# Patient Record
Sex: MALE | Race: WHITE | NOT HISPANIC OR LATINO | Employment: OTHER | URBAN - METROPOLITAN AREA
[De-identification: names, ages, dates, MRNs, and addresses within clinical notes are randomized per-mention and may not be internally consistent; named-entity substitution may affect disease eponyms.]

---

## 2017-01-24 ENCOUNTER — TRANSFERRED RECORDS (OUTPATIENT)
Dept: HEALTH INFORMATION MANAGEMENT | Facility: CLINIC | Age: 52
End: 2017-01-24

## 2017-01-30 DIAGNOSIS — I10 HTN, GOAL BELOW 140/90: ICD-10-CM

## 2017-01-30 DIAGNOSIS — E78.5 HYPERLIPIDEMIA LDL GOAL <130: Primary | ICD-10-CM

## 2017-01-30 NOTE — TELEPHONE ENCOUNTER
simvastatin (ZOCOR) 40 MG tablet       Last Written Prescription Date: 01/14/2016  Last Fill Quantity: 90, # refills: 3    Last Office Visit with [facility], [facility] or [facility] prescribing provider:  05/31/2016   Future Office Visit:      CHOLESTEROL   Date Value Ref Range Status   01/14/2016 207* <200 mg/dL Final     Comment:     Desirable:       <200 mg/dl     HDL CHOLESTEROL   Date Value Ref Range Status   01/14/2016 49 >39 mg/dL Final     LDL CHOLESTEROL CALCULATED   Date Value Ref Range Status   01/14/2016 128* <100 mg/dL Final     Comment:     Above desirable:  100-129 mg/dl   Borderline High:  130-159 mg/dL   High:             160-189 mg/dL   Very high:       >189 mg/dl       TRIGLYCERIDES   Date Value Ref Range Status   01/14/2016 148 <150 mg/dL Final     Comment:     Fasting specimen     CHOLESTEROL/HDL RATIO   Date Value Ref Range Status   12/15/2014 3.6 0.0 - 5.0 Final     ALT   Date Value Ref Range Status   11/27/2013 44 0 - 70 U/L Final     valsartan-hydrochlorothiazide (DIOVAN HCT) 80-12.5 MG per tablet     Last Written Prescription Date: 06/23/2016  Last Fill Quantity: 90, # refills: 1  Last Office Visit with [facility], [facility] or [facility] prescribing provider: 05/31/2016       POTASSIUM   Date Value Ref Range Status   12/01/2016 3.7 3.4 - 5.3 mmol/L Final     CREATININE   Date Value Ref Range Status   12/01/2016 1.07 0.66 - 1.25 mg/dL Final     BP Readings from Last 3 Encounters:   12/15/16 132/84   05/31/16 130/86   04/05/16 133/87

## 2017-02-01 RX ORDER — VALSARTAN AND HYDROCHLOROTHIAZIDE 80; 12.5 MG/1; MG/1
1 TABLET, FILM COATED ORAL DAILY
Qty: 90 TABLET | Refills: 0 | Status: SHIPPED | OUTPATIENT
Start: 2017-02-01 | End: 2017-05-03

## 2017-02-01 RX ORDER — SIMVASTATIN 40 MG
40 TABLET ORAL AT BEDTIME
Qty: 90 TABLET | Refills: 0 | Status: SHIPPED | OUTPATIENT
Start: 2017-02-01 | End: 2017-05-03

## 2017-02-14 ENCOUNTER — ALLIED HEALTH/NURSE VISIT (OUTPATIENT)
Dept: FAMILY MEDICINE | Facility: OTHER | Age: 52
End: 2017-02-14
Payer: COMMERCIAL

## 2017-02-14 DIAGNOSIS — T38.0X5A STEROID-INDUCED DIABETES MELLITUS (H): ICD-10-CM

## 2017-02-14 DIAGNOSIS — M79.671 RIGHT FOOT PAIN: Primary | ICD-10-CM

## 2017-02-14 DIAGNOSIS — E09.9 STEROID-INDUCED DIABETES MELLITUS (H): ICD-10-CM

## 2017-02-14 LAB
ANION GAP SERPL CALCULATED.3IONS-SCNC: 10 MMOL/L (ref 3–14)
BUN SERPL-MCNC: 8 MG/DL (ref 7–30)
CALCIUM SERPL-MCNC: 9.1 MG/DL (ref 8.5–10.1)
CHLORIDE SERPL-SCNC: 101 MMOL/L (ref 94–109)
CO2 SERPL-SCNC: 30 MMOL/L (ref 20–32)
CREAT SERPL-MCNC: 0.9 MG/DL (ref 0.66–1.25)
GFR SERPL CREATININE-BSD FRML MDRD: 88 ML/MIN/1.7M2
GLUCOSE SERPL-MCNC: 120 MG/DL (ref 70–99)
POTASSIUM SERPL-SCNC: 3 MMOL/L (ref 3.4–5.3)
SODIUM SERPL-SCNC: 141 MMOL/L (ref 133–144)

## 2017-02-14 PROCEDURE — 80048 BASIC METABOLIC PNL TOTAL CA: CPT | Performed by: NURSE PRACTITIONER

## 2017-02-14 PROCEDURE — 99207 ZZC NO CHARGE NURSE ONLY: CPT

## 2017-02-14 PROCEDURE — 36415 COLL VENOUS BLD VENIPUNCTURE: CPT | Performed by: NURSE PRACTITIONER

## 2017-02-14 NOTE — MR AVS SNAPSHOT
"              After Visit Summary   2/14/2017    Florentino Gallegos    MRN: [id number]           Patient Information     Date Of Birth          1965        Visit Information        Provider Department      2/14/2017 11:00 AM NL RN [facility]        Today's Diagnoses     Right foot pain    -  1       Follow-ups after your visit        Your next 10 appointments already scheduled     Feb 15, 2017  1:20 PM CST   Office Visit with EVAN Lopez CNP   [facility]    [address] [facility] [id number] [phone]           Bring a current list of meds and any records pertaining to this visit.  For Physicals, please bring immunization records and any forms needing to be filled out.  Please arrive 10 minutes early to complete paperwork.              Who to contact     If you have questions or need follow up information about today's clinic visit or your schedule please contact [facility] directly at [phone].  Normal or non-critical lab and imaging results will be communicated to you by Explore Engagehart, letter or phone within 4 business days after the clinic has received the results. If you do not hear from us within 7 days, please contact the clinic through Explore Engagehart or phone. If you have a critical or abnormal lab result, we will notify you by phone as soon as possible.  Submit refill requests through [employer] or call your pharmacy and they will forward the refill request to us. Please allow 3 business days for your refill to be completed.          Additional Information About Your Visit        Explore Engagehart Information     [employer] lets you send messages to your doctor, view your test results, renew your prescriptions, schedule appointments and more. To sign up, go to [email or web address] . Click on \"Log in\" on the left side of the screen, which will take you to the Welcome page. Then click on \"Sign up Now\" on the right side of the page. "     You will be asked to enter the access code listed below, as well as some personal information. Please follow the directions to create your username and password.     Your access code is: [email or web address]  Expires: 3/15/2017 10:54 AM     Your access code will  in 90 days. If you need help or a new code, please call your [facility] or [phone].        Care EveryWhere ID     This is your Care EveryWhere ID. This could be used by other organizations to access your [place] medical records  [id number]         Blood Pressure from Last 3 Encounters:   12/15/16 132/84   16 130/86   16 133/87    Weight from Last 3 Encounters:   12/15/16 278 lb (126.1 kg)   16 275 lb (124.7 kg)   16 279 lb (126.6 kg)              Today, you had the following     No orders found for display       Primary Care Provider Office Phone # Fax #    EVAN Lopez [phone]       [facility] [address] [facility] [address]        Thank you!     Thank you for choosing [facility]  for your care. Our goal is always to provide you with excellent care. Hearing back from our patients is one way we can continue to improve our services. Please take a few minutes to complete the written survey that you may receive in the mail after your visit with us. Thank you!             Your Updated Medication List - Protect others around you: Learn how to safely use, store and throw away your medicines at www.disposemymeds.org.          This list is accurate as of: 17 12:09 PM.  Always use your most recent med list.                   Brand Name Dispense Instructions for use    albuterol 108 (90 BASE) MCG/ACT Inhaler    PROAIR HFA/PROVENTIL HFA/VENTOLIN HFA    3 Inhaler    Inhale 2 puffs into the lungs every 4 hours as needed       metFORMIN 500 MG tablet    GLUCOPHAGE    60 tablet    Take 1 tablet (500 mg) by mouth 2 times daily (with meals)       methylPREDNISolone 16 MG  tablet    MEDROL     Take 8 mg by mouth daily (with lunch) Taper dosing       simvastatin 40 MG tablet    ZOCOR    90 tablet    Take 1 tablet (40 mg) by mouth At Bedtime at bedtime.       valsartan-hydrochlorothiazide 80-12.5 MG per tablet    DIOVAN HCT    90 tablet    Take 1 tablet by mouth daily

## 2017-02-15 ENCOUNTER — OFFICE VISIT (OUTPATIENT)
Dept: FAMILY MEDICINE | Facility: OTHER | Age: 52
End: 2017-02-15
Payer: COMMERCIAL

## 2017-02-15 VITALS
SYSTOLIC BLOOD PRESSURE: 114 MMHG | OXYGEN SATURATION: 97 % | BODY MASS INDEX: 40.77 KG/M2 | WEIGHT: 269 LBS | DIASTOLIC BLOOD PRESSURE: 70 MMHG | TEMPERATURE: 99.4 F | HEART RATE: 105 BPM | RESPIRATION RATE: 20 BRPM | HEIGHT: 68 IN

## 2017-02-15 DIAGNOSIS — M10.071 ACUTE IDIOPATHIC GOUT OF RIGHT FOOT: Primary | ICD-10-CM

## 2017-02-15 DIAGNOSIS — R60.1 GENERALIZED EDEMA: ICD-10-CM

## 2017-02-15 DIAGNOSIS — T38.0X5A STEROID-INDUCED DIABETES MELLITUS (H): ICD-10-CM

## 2017-02-15 DIAGNOSIS — I10 HTN, GOAL BELOW 140/90: ICD-10-CM

## 2017-02-15 DIAGNOSIS — E09.9 STEROID-INDUCED DIABETES MELLITUS (H): ICD-10-CM

## 2017-02-15 PROCEDURE — 99207 C FOOT EXAM  NO CHARGE: CPT | Performed by: NURSE PRACTITIONER

## 2017-02-15 PROCEDURE — 99214 OFFICE O/P EST MOD 30 MIN: CPT | Performed by: NURSE PRACTITIONER

## 2017-02-15 RX ORDER — INDOMETHACIN 50 MG/1
50 CAPSULE ORAL
Qty: 42 CAPSULE | Refills: 1 | Status: SHIPPED | OUTPATIENT
Start: 2017-02-15 | End: 2017-12-11

## 2017-02-15 RX ORDER — HYDROCHLOROTHIAZIDE 12.5 MG/1
12.5 CAPSULE ORAL DAILY
Qty: 30 CAPSULE | Refills: 1 | Status: SHIPPED | OUTPATIENT
Start: 2017-02-15 | End: 2017-05-12

## 2017-02-15 ASSESSMENT — PAIN SCALES - GENERAL: PAINLEVEL: EXTREME PAIN (8)

## 2017-02-15 NOTE — PROGRESS NOTES
SUBJECTIVE:                                                    Florentino Gallegos is a 51 year old male who presents to clinic today for the following health issues:      Foot Pain  - duration: 1.5 weeks  -other sx: swelling  -location: Right > Left  -Hx: diabetes, HTN    Has chronic LE edema, but this is worsening now.  Right ankle pain has been severe for about a week.  History of gout and this feels similar.  Had previously been on high dose steroids for chronic lung disease, but weaned off this about 2 months ago.  He is wondering if that was keeping his gout under control as he did not have as many flares while on that.     Problem list and histories reviewed & adjusted, as indicated.  Additional history: none    Patient Active Problem List   Diagnosis     HYPERLIPIDEMIA LDL GOAL <130     HTN, goal below 140/90     Acute gouty arthritis     Obesity     Low back pain     Abnormal chest CT     Berylliosis (H)     Steroid-induced diabetes mellitus (H)     Past Surgical History   Procedure Laterality Date     C nonspecific procedure       Rt ankle fx-pins     C nonspecific procedure       appendectomy     Colonoscopy N/A 1/7/2015     Procedure: COLONOSCOPY;  Surgeon: Anna Holbrook MD;  Location:  GI       Social History   Substance Use Topics     Smoking status: Never Smoker     Smokeless tobacco: Never Used     Alcohol use 0.0 oz/week     0 Standard drinks or equivalent per week      Comment: about 6 beers 2-3 times/week.     Family History   Problem Relation Age of Onset     DIABETES Father      DIABETES Mother          Current Outpatient Prescriptions   Medication Sig Dispense Refill     hydrochlorothiazide (MICROZIDE) 12.5 MG capsule Take 1 capsule (12.5 mg) by mouth daily 30 capsule 1     indomethacin (INDOCIN) 50 MG capsule Take 1 capsule (50 mg) by mouth 3 times daily (with meals) 42 capsule 1     order for DME Equipment being ordered: compression stockings 1 Units 0     simvastatin (ZOCOR) 40 MG  "tablet Take 1 tablet (40 mg) by mouth At Bedtime at bedtime. 90 tablet 0     valsartan-hydrochlorothiazide (DIOVAN HCT) 80-12.5 MG per tablet Take 1 tablet by mouth daily 90 tablet 0     albuterol (PROAIR HFA, PROVENTIL HFA, VENTOLIN HFA) 108 (90 BASE) MCG/ACT inhaler Inhale 2 puffs into the lungs every 4 hours as needed 3 Inhaler 3     metFORMIN (GLUCOPHAGE) 500 MG tablet Take 1 tablet (500 mg) by mouth 2 times daily (with meals) 60 tablet 1     Allergies   Allergen Reactions     No Known Drug Allergies      BP Readings from Last 3 Encounters:   02/15/17 114/70   12/15/16 132/84   05/31/16 130/86    Wt Readings from Last 3 Encounters:   02/15/17 269 lb (122 kg)   12/15/16 278 lb (126.1 kg)   05/31/16 275 lb (124.7 kg)                    ROS:  C: NEGATIVE for fever, chills, change in weight  E/M: NEGATIVE for ear, mouth and throat problems  RESP:chronic lung disease, unchanged from baseline  CV: NEGATIVE for chest pain, palpitations   MUSCULOSKELETAL: bilateral LE swelling as above, right ankle pain as above     OBJECTIVE:                                                    /70 (BP Location: Left arm, Cuff Size: Adult Large)  Pulse 105  Temp 99.4  F (37.4  C) (Tympanic)  Resp 20  Ht 5' 8\" (1.727 m)  Wt 269 lb (122 kg)  SpO2 97%  BMI 40.9 kg/m2  Body mass index is 40.9 kg/(m^2).  GENERAL: healthy, alert and no distress  RESP: no rales , no rhonchi, no wheezes and decreased breath sounds throughout  CV: regular rate and rhythm, normal S1 S2, no S3 or S4, no murmur, click or rub  MS: Bilateral LE swelling +2, right side slightly worse compared to left.  No erythema or warmth.  He has significant pain on the right ankle.      Diabetic foot exam: normal DP and PT pulses, no trophic changes or ulcerative lesions and reduced sensation - plantar surface bilaterally     Diagnostic Test Results:  none      ASSESSMENT/PLAN:                                                        1. Acute idiopathic gout of right foot  Will " treat with Indomethacin for likely gout.  He would like to avoid steroids if at all possible.   - indomethacin (INDOCIN) 50 MG capsule; Take 1 capsule (50 mg) by mouth 3 times daily (with meals)  Dispense: 42 capsule; Refill: 1      2. Generalized edema  Will try increasing his diuretic slightly.  Will also try compression stockings.   - hydrochlorothiazide (MICROZIDE) 12.5 MG capsule; Take 1 capsule (12.5 mg) by mouth daily  Dispense: 30 capsule; Refill: 1  - order for DME; Equipment being ordered: compression stockings  Dispense: 1 Units; Refill: 0    3. HTN, goal below 140/90  Chronic, controlled.  No change in treatment plan.   - Basic metabolic panel  (Ca, Cl, CO2, Creat, Gluc, K, Na, BUN); Future    4. Steroid-induced diabetes mellitus (H)  Is no longer on oral steroids.  His last fasting glucose level was 120.  Will check an A1C next week, but this has likely resolved.  He already stopped the Metformin as it was making him nauseated.   - FOOT EXAM  - Albumin Random Urine Quantitative; Future  - TSH with free T4 reflex; Future  - Hemoglobin A1c; Future      He will return for repeat labs in 1 week.  Had mild hypokalemia on last BMP.  He reports he was not eating well at all due to his Metformin causing nausea and vomiting.  Will recheck in 1 week.   See Patient Instructions    EVAN Lopez [facility]

## 2017-02-15 NOTE — MR AVS SNAPSHOT
After Visit Summary   2/15/2017    Florentino Gallegos    MRN: [id number]           Patient Information     Date Of Birth          1965        Visit Information        Provider Department      2/15/2017 1:20 PM Shahrzad Fajardo APRN [facility]        Today's Diagnoses     Generalized edema    -  1    HTN, goal below 140/90        Acute idiopathic gout of right foot        Steroid-induced diabetes mellitus (H)          Care Instructions    Increase your diuretic.  I sent over a new prescription for this.  Take with your normal blood pressure medication.     Have labs done on Monday next week.     Try the compression stockings.  Wear all day, remove at night.     Take the Indomethacin as prescribed for the gout.  If this does not resolve, let me know.       Leg Swelling in Both Legs    Swelling of the feet, ankles, and legs is called edema. It is caused by excess fluid that has collected in the tissues. Extra fluid in the body settles in the lowest part because of gravity. This is why the legs and feet are most affected.  Some of the causes for edema include:    Disease of the heart like congestive heart failure    Standing or sitting for long periods of time. Sitting with your legs in the down position may do this.    Infection of the feet or legs    Blood pooling in the veins of your legs (venous insufficiency)    Dilated veins in your lower leg (varicose veins)    Garters or other clothing that is tight on your legs. This will cause blood to pool in your legs because the clothing limits blood flow.    Some medicines. These include hormones like birth control pills. They also include some blood pressure medicines like calcium channel blockers and steroids. Other medicines include some antidepressants like MAO inhibitors and tricyclics.    Menstrual periods that cause you to retain fluids    Many types of renal disease    Liver failure or cirrhosis    Pregnancy. Some swelling is  normal, but a sudden increase in leg swelling or weight gain can be a sign of a dangerous complication of pregnancy..    Poor nutrition  Medical treatment will depend on what is causing the swelling in your legs. Your health care provider may prescribe water pills (diuretics) to get rid of the extra fluid.  Home care  Follow these guidelines when caring for yourself at home:    Don't wear clothing like garters that is tight on your legs.    Keep your legs up while lying or sitting.    If infection, injury, or recent surgery is causing the swelling, stay off your legs as much as possible until symptoms get better.    If your health care provider says that your leg swelling is caused by venous insufficiency or varicose veins, don't sit or  one place for long periods of time. Take breaks and walk about every few hours. Brisk walking is a good exercise. It helps circulate the blood that has collected in your leg. Talk with your provider about using support stockings to stop daytime leg swelling.    If your provider says that heart disease is causing your leg swelling, follow a low-salt diet to stop extra fluid from staying in your body.  Follow-up care  Follow up with your health care provider, or as advised.  When to seek medical advice  Call your health care provider right away if any of these occur:    New shortness of breath or chest pain    Shortness of breath or chest pain that gets worse    Swelling in both legs or ankles that gets worse    Swelling of the abdomen    Redness, warmth, or swelling in one leg    Fever of 100.4 F (38 C) or higher, or as directed by your health care provider    Yellow color to your skin or eyes    Rapid, unexplained weight gain       8468-2417 [employer]. [address] [place] [address], [place] [address]. All rights reserved. This information is not intended as a substitute for professional medical care. Always follow your healthcare professional's  "instructions.                Follow-ups after your visit        Future tests that were ordered for you today     Open Future Orders        Priority Expected Expires Ordered    Albumin Random Urine Quantitative Routine  2/15/2018 2/15/2017    TSH with free T4 reflex Routine  2/15/2018 2/15/2017    Basic metabolic panel  (Ca, Cl, CO2, Creat, Gluc, K, Na, BUN) Routine  2/15/2018 2/15/2017    Hemoglobin A1c Routine  2/15/2018 2/15/2017            Who to contact     If you have questions or need follow up information about today's clinic visit or your schedule please contact [facility] directly at [phone].  Normal or non-critical lab and imaging results will be communicated to you by Payment pluginhart, letter or phone within 4 business days after the clinic has received the results. If you do not hear from us within 7 days, please contact the clinic through Payment pluginhart or phone. If you have a critical or abnormal lab result, we will notify you by phone as soon as possible.  Submit refill requests through [employer] or call your pharmacy and they will forward the refill request to us. Please allow 3 business days for your refill to be completed.          Additional Information About Your Visit        [employer] Information     We[employer]Engine lets you send messages to your doctor, view your test results, renew your prescriptions, schedule appointments and more. To sign up, go to [email or web address] . Click on \"Log in\" on the left side of the screen, which will take you to the Welcome page. Then click on \"Sign up Now\" on the right side of the page.     You will be asked to enter the access code listed below, as well as some personal information. Please follow the directions to create your username and password.     Your access code is: [email or web address]  Expires: 3/15/2017 10:54 AM     Your access code will  in 90 days. If you need help or a new code, please call your [facility] or [phone].        Care EveryWhere ID  " "   This is your Care EveryWhere ID. This could be used by other organizations to access your [place] medical records  [id number]        Your Vitals Were     Pulse Temperature Respirations Height Pulse Oximetry BMI (Body Mass Index)    105 99.4  F (37.4  C) (Tympanic) 20 5' 8\" (1.727 m) 97% 40.9 kg/m2       Blood Pressure from Last 3 Encounters:   02/15/17 114/70   12/15/16 132/84   05/31/16 130/86    Weight from Last 3 Encounters:   02/15/17 269 lb (122 kg)   12/15/16 278 lb (126.1 kg)   05/31/16 275 lb (124.7 kg)              We Performed the Following     FOOT EXAM          Today's Medication Changes          These changes are accurate as of: 2/15/17  1:58 PM.  If you have any questions, ask your nurse or doctor.               Start taking these medicines.        Dose/Directions    hydrochlorothiazide 12.5 MG capsule   Commonly known as:  MICROZIDE   Used for:  Generalized edema   Started by:  Shahrzad Fajardo APRN CNP        Dose:  12.5 mg   Take 1 capsule (12.5 mg) by mouth daily   Quantity:  30 capsule   Refills:  1       indomethacin 50 MG capsule   Commonly known as:  INDOCIN   Used for:  Acute idiopathic gout of right foot   Started by:  Shahrzad Fajardo APRN CNP        Dose:  50 mg   Take 1 capsule (50 mg) by mouth 3 times daily (with meals)   Quantity:  42 capsule   Refills:  1       order for DME   Used for:  Acute idiopathic gout of right foot   Started by:  Shahrzad Fajardo APRN CNP        Equipment being ordered: compression stockings   Quantity:  1 Units   Refills:  0            Where to get your medicines      These medications were sent to [facility] - [address] [facility]  [address], [place] [address]     Phone:  [phone]     hydrochlorothiazide 12.5 MG capsule    indomethacin 50 MG capsule         Some of these will need a paper prescription and others can be bought over the counter.  Ask your nurse if you have questions.     Bring a paper prescription for each of these " medications     order for DME                Primary Care Provider Office Phone # Fax #    EVAN Lopez [phone]       [facility] [address] [facility] [address]        Thank you!     Thank you for choosing [facility]  for your care. Our goal is always to provide you with excellent care. Hearing back from our patients is one way we can continue to improve our services. Please take a few minutes to complete the written survey that you may receive in the mail after your visit with us. Thank you!             Your Updated Medication List - Protect others around you: Learn how to safely use, store and throw away your medicines at www.disposemymeds.org.          This list is accurate as of: 2/15/17  1:58 PM.  Always use your most recent med list.                   Brand Name Dispense Instructions for use    albuterol 108 (90 BASE) MCG/ACT Inhaler    PROAIR HFA/PROVENTIL HFA/VENTOLIN HFA    3 Inhaler    Inhale 2 puffs into the lungs every 4 hours as needed       hydrochlorothiazide 12.5 MG capsule    MICROZIDE    30 capsule    Take 1 capsule (12.5 mg) by mouth daily       indomethacin 50 MG capsule    INDOCIN    42 capsule    Take 1 capsule (50 mg) by mouth 3 times daily (with meals)       metFORMIN 500 MG tablet    GLUCOPHAGE    60 tablet    Take 1 tablet (500 mg) by mouth 2 times daily (with meals)       order for DME     1 Units    Equipment being ordered: compression stockings       simvastatin 40 MG tablet    ZOCOR    90 tablet    Take 1 tablet (40 mg) by mouth At Bedtime at bedtime.       valsartan-hydrochlorothiazide 80-12.5 MG per tablet    DIOVAN HCT    90 tablet    Take 1 tablet by mouth daily

## 2017-02-15 NOTE — NURSING NOTE
"Chief Complaint   Patient presents with     Musculoskeletal Problem     R>L       Initial /70 (BP Location: Left arm, Cuff Size: Adult Large)  Pulse 105  Temp 99.4  F (37.4  C) (Tympanic)  Resp 20  Ht 5' 8\" (1.727 m)  Wt 269 lb (122 kg)  SpO2 97%  BMI 40.9 kg/m2 Estimated body mass index is 40.9 kg/(m^2) as calculated from the following:    Height as of this encounter: 5' 8\" (1.727 m).    Weight as of this encounter: 269 lb (122 kg).  Medication Reconciliation: complete   ................Rebel Alan LPN,   February 15, 2017,      1:29 PM,   [facility]    "

## 2017-02-15 NOTE — PATIENT INSTRUCTIONS
Increase your diuretic.  I sent over a new prescription for this.  Take with your normal blood pressure medication.     Have labs done on Monday next week.     Try the compression stockings.  Wear all day, remove at night.     Take the Indomethacin as prescribed for the gout.  If this does not resolve, let me know.       Leg Swelling in Both Legs    Swelling of the feet, ankles, and legs is called edema. It is caused by excess fluid that has collected in the tissues. Extra fluid in the body settles in the lowest part because of gravity. This is why the legs and feet are most affected.  Some of the causes for edema include:    Disease of the heart like congestive heart failure    Standing or sitting for long periods of time. Sitting with your legs in the down position may do this.    Infection of the feet or legs    Blood pooling in the veins of your legs (venous insufficiency)    Dilated veins in your lower leg (varicose veins)    Garters or other clothing that is tight on your legs. This will cause blood to pool in your legs because the clothing limits blood flow.    Some medicines. These include hormones like birth control pills. They also include some blood pressure medicines like calcium channel blockers and steroids. Other medicines include some antidepressants like MAO inhibitors and tricyclics.    Menstrual periods that cause you to retain fluids    Many types of renal disease    Liver failure or cirrhosis    Pregnancy. Some swelling is normal, but a sudden increase in leg swelling or weight gain can be a sign of a dangerous complication of pregnancy..    Poor nutrition  Medical treatment will depend on what is causing the swelling in your legs. Your health care provider may prescribe water pills (diuretics) to get rid of the extra fluid.  Home care  Follow these guidelines when caring for yourself at home:    Don't wear clothing like garters that is tight on your legs.    Keep your legs up while lying or  sitting.    If infection, injury, or recent surgery is causing the swelling, stay off your legs as much as possible until symptoms get better.    If your health care provider says that your leg swelling is caused by venous insufficiency or varicose veins, don't sit or  one place for long periods of time. Take breaks and walk about every few hours. Brisk walking is a good exercise. It helps circulate the blood that has collected in your leg. Talk with your provider about using support stockings to stop daytime leg swelling.    If your provider says that heart disease is causing your leg swelling, follow a low-salt diet to stop extra fluid from staying in your body.  Follow-up care  Follow up with your health care provider, or as advised.  When to seek medical advice  Call your health care provider right away if any of these occur:    New shortness of breath or chest pain    Shortness of breath or chest pain that gets worse    Swelling in both legs or ankles that gets worse    Swelling of the abdomen    Redness, warmth, or swelling in one leg    Fever of 100.4 F (38 C) or higher, or as directed by your health care provider    Yellow color to your skin or eyes    Rapid, unexplained weight gain       4751-4262 [employer]. [address] [place] [address], [place] [address]. All rights reserved. This information is not intended as a substitute for professional medical care. Always follow your healthcare professional's instructions.

## 2017-02-23 DIAGNOSIS — T38.0X5A STEROID-INDUCED DIABETES MELLITUS (H): ICD-10-CM

## 2017-02-23 DIAGNOSIS — E09.9 STEROID-INDUCED DIABETES MELLITUS (H): ICD-10-CM

## 2017-02-23 DIAGNOSIS — I10 HTN, GOAL BELOW 140/90: ICD-10-CM

## 2017-02-23 LAB
ANION GAP SERPL CALCULATED.3IONS-SCNC: 10 MMOL/L (ref 3–14)
BUN SERPL-MCNC: 16 MG/DL (ref 7–30)
CALCIUM SERPL-MCNC: 8.6 MG/DL (ref 8.5–10.1)
CHLORIDE SERPL-SCNC: 110 MMOL/L (ref 94–109)
CO2 SERPL-SCNC: 28 MMOL/L (ref 20–32)
CREAT SERPL-MCNC: 1.1 MG/DL (ref 0.66–1.25)
CREAT UR-MCNC: 391 MG/DL
GFR SERPL CREATININE-BSD FRML MDRD: 70 ML/MIN/1.7M2
GLUCOSE SERPL-MCNC: 112 MG/DL (ref 70–99)
HBA1C MFR BLD: 5.5 % (ref 4.3–6)
MICROALBUMIN UR-MCNC: 21 MG/L
MICROALBUMIN/CREAT UR: 5.27 MG/G CR (ref 0–17)
POTASSIUM SERPL-SCNC: 3.2 MMOL/L (ref 3.4–5.3)
SODIUM SERPL-SCNC: 148 MMOL/L (ref 133–144)
TSH SERPL DL<=0.005 MIU/L-ACNC: 2.71 MU/L (ref 0.4–4)

## 2017-02-23 PROCEDURE — 82043 UR ALBUMIN QUANTITATIVE: CPT | Performed by: NURSE PRACTITIONER

## 2017-02-23 PROCEDURE — 83036 HEMOGLOBIN GLYCOSYLATED A1C: CPT | Performed by: NURSE PRACTITIONER

## 2017-02-23 PROCEDURE — 84443 ASSAY THYROID STIM HORMONE: CPT | Performed by: NURSE PRACTITIONER

## 2017-02-23 PROCEDURE — 80048 BASIC METABOLIC PNL TOTAL CA: CPT | Performed by: NURSE PRACTITIONER

## 2017-02-23 PROCEDURE — 36415 COLL VENOUS BLD VENIPUNCTURE: CPT | Performed by: NURSE PRACTITIONER

## 2017-03-10 ENCOUNTER — TELEPHONE (OUTPATIENT)
Dept: FAMILY MEDICINE | Facility: CLINIC | Age: 52
End: 2017-03-10

## 2017-03-10 NOTE — TELEPHONE ENCOUNTER
Patient called back would like forms forwarded to [facility] Attn:  Dr. Tam  Fax:  [phone]

## 2017-03-10 NOTE — TELEPHONE ENCOUNTER
Recd 6 page fax from [facility] for Physical Ability Assessment.  Pt has not been seen since May 2016 and needs appt to complete.  Spoke with patient and he will call back to reschedule or have form forwarded to [facility].  Form in TC basket at [place].    Harriett Gaming

## 2017-04-01 ENCOUNTER — EXTERNAL ORDER RESULTS (OUTPATIENT)
Dept: HEALTH INFORMATION MANAGEMENT | Facility: CLINIC | Age: 52
End: 2017-04-01

## 2017-04-11 ENCOUNTER — TRANSFERRED RECORDS (OUTPATIENT)
Dept: HEALTH INFORMATION MANAGEMENT | Facility: CLINIC | Age: 52
End: 2017-04-11

## 2017-04-12 ENCOUNTER — TRANSFERRED RECORDS (OUTPATIENT)
Dept: HEALTH INFORMATION MANAGEMENT | Facility: CLINIC | Age: 52
End: 2017-04-12

## 2017-04-12 LAB
ALT SERPL-CCNC: 36 U/L (ref 10–40)
AST SERPL-CCNC: 26 U/L (ref 10–40)
CREAT SERPL-MCNC: 1.2 MG/DL (ref 0.5–1.3)
GLUCOSE SERPL-MCNC: 94 MG/DL (ref 65–110)
HEP C HIM: NORMAL
POTASSIUM SERPL-SCNC: 3.7 MMOL/L (ref 3.7–5)

## 2017-04-13 ENCOUNTER — TRANSFERRED RECORDS (OUTPATIENT)
Dept: HEALTH INFORMATION MANAGEMENT | Facility: CLINIC | Age: 52
End: 2017-04-13

## 2017-04-17 ENCOUNTER — TRANSFERRED RECORDS (OUTPATIENT)
Dept: HEALTH INFORMATION MANAGEMENT | Facility: CLINIC | Age: 52
End: 2017-04-17

## 2017-05-03 DIAGNOSIS — I10 HTN, GOAL BELOW 140/90: ICD-10-CM

## 2017-05-03 DIAGNOSIS — E78.5 HYPERLIPIDEMIA LDL GOAL <130: ICD-10-CM

## 2017-05-03 RX ORDER — VALSARTAN AND HYDROCHLOROTHIAZIDE 80; 12.5 MG/1; MG/1
TABLET, FILM COATED ORAL
Qty: 90 TABLET | Refills: 0 | Status: SHIPPED | OUTPATIENT
Start: 2017-05-03 | End: 2017-05-12

## 2017-05-03 RX ORDER — SIMVASTATIN 40 MG
TABLET ORAL
Qty: 90 TABLET | Refills: 0 | Status: SHIPPED | OUTPATIENT
Start: 2017-05-03 | End: 2017-08-23

## 2017-05-03 NOTE — TELEPHONE ENCOUNTER
Simvastatin  Routing refill request to provider for review/approval because:  Labs out of range:  FLP  Labs not current:  FLP    Diovan  Routing refill request to provider for review/approval because:  Labs out of range:  Potassium    Valerie Knowles RN  [facility]

## 2017-05-03 NOTE — TELEPHONE ENCOUNTER
Simvastatin     Last Written Prescription Date: 2/1/17  Last Fill Quantity: 90, # refills: 0  Last Office Visit with [facility], [facility] or  Health prescribing provider: 2/15/17       Lab Results   Component Value Date    CHOL 207 01/14/2016     Lab Results   Component Value Date    HDL 49 01/14/2016     Lab Results   Component Value Date     01/14/2016     Lab Results   Component Value Date    TRIG 148 01/14/2016     Lab Results   Component Value Date    CHOLHDLRATIO 3.6 12/15/2014     Diovan      Last Written Prescription Date: 2/1/17  Last Fill Quantity: 90, # refills: 0  Last Office Visit with [facility], [facility] or [facility] prescribing provider: 2/15/17       Potassium   Date Value Ref Range Status   02/23/2017 3.2 (L) 3.4 - 5.3 mmol/L Final     Creatinine   Date Value Ref Range Status   02/23/2017 1.10 0.66 - 1.25 mg/dL Final     BP Readings from Last 3 Encounters:   02/15/17 114/70   12/15/16 132/84   05/31/16 130/86

## 2017-05-12 ENCOUNTER — OFFICE VISIT (OUTPATIENT)
Dept: FAMILY MEDICINE | Facility: CLINIC | Age: 52
End: 2017-05-12
Payer: COMMERCIAL

## 2017-05-12 ENCOUNTER — TRANSFERRED RECORDS (OUTPATIENT)
Dept: HEALTH INFORMATION MANAGEMENT | Facility: CLINIC | Age: 52
End: 2017-05-12

## 2017-05-12 VITALS
BODY MASS INDEX: 39.84 KG/M2 | TEMPERATURE: 98.7 F | HEIGHT: 69 IN | RESPIRATION RATE: 16 BRPM | WEIGHT: 269 LBS | SYSTOLIC BLOOD PRESSURE: 152 MMHG | HEART RATE: 66 BPM | DIASTOLIC BLOOD PRESSURE: 86 MMHG | OXYGEN SATURATION: 97 %

## 2017-05-12 DIAGNOSIS — E66.01 MORBID OBESITY DUE TO EXCESS CALORIES (H): ICD-10-CM

## 2017-05-12 DIAGNOSIS — J63.2: ICD-10-CM

## 2017-05-12 DIAGNOSIS — Z23 NEED FOR PNEUMOCOCCAL VACCINATION: ICD-10-CM

## 2017-05-12 DIAGNOSIS — I10 HTN, GOAL BELOW 140/90: Primary | ICD-10-CM

## 2017-05-12 PROCEDURE — 80061 LIPID PANEL: CPT | Performed by: FAMILY MEDICINE

## 2017-05-12 PROCEDURE — 90471 IMMUNIZATION ADMIN: CPT | Performed by: FAMILY MEDICINE

## 2017-05-12 PROCEDURE — 99214 OFFICE O/P EST MOD 30 MIN: CPT | Mod: 25 | Performed by: FAMILY MEDICINE

## 2017-05-12 PROCEDURE — 36415 COLL VENOUS BLD VENIPUNCTURE: CPT | Performed by: FAMILY MEDICINE

## 2017-05-12 PROCEDURE — 90670 PCV13 VACCINE IM: CPT | Performed by: FAMILY MEDICINE

## 2017-05-12 PROCEDURE — 80048 BASIC METABOLIC PNL TOTAL CA: CPT | Performed by: FAMILY MEDICINE

## 2017-05-12 RX ORDER — VALSARTAN AND HYDROCHLOROTHIAZIDE 160; 25 MG/1; MG/1
1 TABLET ORAL DAILY
Qty: 90 TABLET | Refills: 1 | Status: SHIPPED | OUTPATIENT
Start: 2017-05-12 | End: 2017-11-25

## 2017-05-12 NOTE — NURSING NOTE
"Chief Complaint   Patient presents with     Recheck Medication     fasting labs     Referral     echocardiogram and dexa scan       Initial /86 (BP Location: Right arm, Patient Position: Chair, Cuff Size: Adult Large)  Pulse 66  Temp 98.7  F (37.1  C) (Oral)  Resp 16  Ht 5' 9\" (1.753 m)  Wt 269 lb (122 kg)  SpO2 97%  BMI 39.72 kg/m2 Estimated body mass index is 39.72 kg/(m^2) as calculated from the following:    Height as of this encounter: 5' 9\" (1.753 m).    Weight as of this encounter: 269 lb (122 kg).  Medication Reconciliation: complete   Hermelinda Sharp, [place]      "

## 2017-05-12 NOTE — Clinical Note
Please abstract the following data from this visit with this patient into the appropriate field in Epic:  Eye exam with ophthalmology on this date: 4/2017

## 2017-05-12 NOTE — LETTER
[facility]  [address] [place], [address]  [phone]      May 13, 2017    Florentino Gallegos  [address] [facility] [address]          Dear Florentino,    Your cholesterol is much better than previous, and your blood sugar is normal. Enclosed is a copy of the results.  It was a pleasure to see you at your last appointment.    If you have any questions or concerns, please call myself or my nurse at [phone].          Sincerely,    Dimitris Bansal MD                                                  Results for orders placed or performed in visit on 05/12/17   Lipid panel reflex to direct LDL   Result Value Ref Range    Cholesterol 186 <200 mg/dL    Triglycerides 127 <150 mg/dL    HDL Cholesterol 43 >39 mg/dL    LDL Cholesterol Calculated 118 (H) <100 mg/dL    Non HDL Cholesterol 143 (H) <130 mg/dL   Basic metabolic panel   Result Value Ref Range    Sodium 144 133 - 144 mmol/L    Potassium 4.2 3.4 - 5.3 mmol/L    Chloride 112 (H) 94 - 109 mmol/L    Carbon Dioxide 26 20 - 32 mmol/L    Anion Gap 6 3 - 14 mmol/L    Glucose 100 (H) 70 - 99 mg/dL    Urea Nitrogen 14 7 - 30 mg/dL    Creatinine 1.07 0.66 - 1.25 mg/dL    GFR Estimate 72 >60 mL/min/1.7m2    GFR Estimate If Black 87 >60 mL/min/1.7m2    Calcium 9.0 8.5 - 10.1 mg/dL     KS

## 2017-05-12 NOTE — MR AVS SNAPSHOT
"              After Visit Summary   5/12/2017    Florentino Gallegos    MRN: [id number]           Patient Information     Date Of Birth          1965        Visit Information        Provider Department      5/12/2017 10:30 AM Dimitris Bansal MD [facility]        Today's Diagnoses     Need for hepatitis C screening test    -  1    HTN, goal below 140/90        Berylliosis (H)          Care Instructions    They will be calling you from [facility] for your Echo (heart test) appointment.  Your appointment for Dexa scan for the bone is going to be on   Follow up with me in 2 weeks, your appointment is on           Follow-ups after your visit        Future tests that were ordered for you today     Open Future Orders        Priority Expected Expires Ordered    Echocardiogram Complete Routine  5/12/2018 5/12/2017    DX Hip/Pelvis/Spine Routine  5/12/2018 5/12/2017            Who to contact     If you have questions or need follow up information about today's clinic visit or your schedule please contact [facility] directly at [phone].  Normal or non-critical lab and imaging results will be communicated to you by [employer], letter or phone within 4 business days after the clinic has received the results. If you do not hear from us within 7 days, please contact the clinic through [employer] or phone. If you have a critical or abnormal lab result, we will notify you by phone as soon as possible.  Submit refill requests through Dr. TATTOFF or call your pharmacy and they will forward the refill request to us. Please allow 3 business days for your refill to be completed.          Additional Information About Your Visit        [employer] Information     Dr. TATTOFF lets you send messages to your doctor, view your test results, renew your prescriptions, schedule appointments and more. To sign up, go to [email or web address] . Click on \"Log in\" on the left side of the screen, which will take you to " "the Welcome page. Then click on \"Sign up Now\" on the right side of the page.     You will be asked to enter the access code listed below, as well as some personal information. Please follow the directions to create your username and password.     Your access code is: [email or web address]  Expires: 8/10/2017 10:45 AM     Your access code will  in 90 days. If you need help or a new code, please call your [facility] clinic or [phone].        Care EveryWhere ID     This is your Care EveryWhere ID. This could be used by other organizations to access your [facility] medical records  [id number]        Your Vitals Were     Pulse Temperature Respirations Height Pulse Oximetry BMI (Body Mass Index)    66 98.7  F (37.1  C) (Oral) 16 5' 9\" (1.753 m) 97% 39.72 kg/m2       Blood Pressure from Last 3 Encounters:   17 152/86   02/15/17 114/70   12/15/16 132/84    Weight from Last 3 Encounters:   17 269 lb (122 kg)   02/15/17 269 lb (122 kg)   12/15/16 278 lb (126.1 kg)              We Performed the Following     Basic metabolic panel     Lipid panel reflex to direct LDL          Today's Medication Changes          These changes are accurate as of: 17 10:45 AM.  If you have any questions, ask your nurse or doctor.               Start taking these medicines.        Dose/Directions    valsartan-hydrochlorothiazide 160-25 MG per tablet   Commonly known as:  DIOVAN-HCT   Used for:  HTN, goal below 140/90   Replaces:  valsartan-hydrochlorothiazide 80-12.5 MG per tablet   Started by:  Dimitris Bansal MD        Dose:  1 tablet   Take 1 tablet by mouth daily   Quantity:  90 tablet   Refills:  1         Stop taking these medicines if you haven't already. Please contact your care team if you have questions.     valsartan-hydrochlorothiazide 80-12.5 MG per tablet   Commonly known as:  DIOVAN-HCT   Replaced by:  valsartan-hydrochlorothiazide 160-25 MG per tablet   Stopped by:  Dimitris Bansal MD                Where to get your " medicines      These medications were sent to [facility] - [place] - [address]  [address], [place] [address]     Phone:  [phone]     valsartan-hydrochlorothiazide 160-25 MG per tablet                Primary Care Provider Office Phone # Fax #    EVAN Lopez [phone]       [facility] [address]  [facility] [address]        Thank you!     Thank you for choosing [facility]  for your care. Our goal is always to provide you with excellent care. Hearing back from our patients is one way we can continue to improve our services. Please take a few minutes to complete the written survey that you may receive in the mail after your visit with us. Thank you!             Your Updated Medication List - Protect others around you: Learn how to safely use, store and throw away your medicines at www.disposemymeds.org.          This list is accurate as of: 5/12/17 10:45 AM.  Always use your most recent med list.                   Brand Name Dispense Instructions for use    albuterol 108 (90 BASE) MCG/ACT Inhaler    PROAIR HFA/PROVENTIL HFA/VENTOLIN HFA    3 Inhaler    Inhale 2 puffs into the lungs every 4 hours as needed       indomethacin 50 MG capsule    INDOCIN    42 capsule    Take 1 capsule (50 mg) by mouth 3 times daily (with meals)       order for DME     1 Units    Equipment being ordered: compression stockings       simvastatin 40 MG tablet    ZOCOR    90 tablet    TAKE ONE TABLET BY MOUTH AT BEDTIME       valsartan-hydrochlorothiazide 160-25 MG per tablet    DIOVAN-HCT    90 tablet    Take 1 tablet by mouth daily

## 2017-05-12 NOTE — PATIENT INSTRUCTIONS
They will be calling you from [facility] for your Echo (heart test) appointment.  Your appointment for Dexa scan for the bone is going to be on   Follow up with me in 2 weeks, your appointment is on

## 2017-05-12 NOTE — PROGRESS NOTES
SUBJECTIVE:                                                    Florentino Gallegos is a 52 year old male who presents to clinic today for the following health issues:      Hyperlipidemia Follow-Up      Rate your low fat/cholesterol diet?: fair    Taking statin?  Yes, no muscle aches from statin    Other lipid medications/supplements?:  none     Hypertension Follow-up      Outpatient blood pressures are not being checked.    Low Salt Diet: low salt       Amount of exercise or physical activity: 6-7 days/week for an average of greater than 60 minutes    Problems taking medications regularly: No    Medication side effects: none    Diet: regular (no restrictions)      Berilliousis  Follow-Up    Symptoms are currently: stable    Current fatigue or dyspnea with ambulation: yes, within 1 block.    Shortness of breath: yes 1 block or so.    Increased or change in Cough/Sputum: No    Fever(s): No    Baseline ambulation without stopping to rest 1 blocks. Able to walk up 1 flights of stairs without stopping to rest.    Any ER/UC or hospital admissions since your last visit? No     History   Smoking Status     Never Smoker   Smokeless Tobacco     Never Used     Lab Results   Component Value Date    FEV1 67 04/05/2016    HZB6FAA 98 04/05/2016          Problem list and histories reviewed & adjusted, as indicated.  Additional history: pt has been following up with pulmonary clinic in [place] in regards to his berilliosis, and they are still taking biopsies and lavage.  They recommended multiple tests as below.    Patient Active Problem List   Diagnosis     HYPERLIPIDEMIA LDL GOAL <130     HTN, goal below 140/90     Acute gouty arthritis     Low back pain     Abnormal chest CT     Berylliosis (H)     Steroid-induced diabetes mellitus (H)     Morbid obesity due to excess calories (H)     Past Surgical History:   Procedure Laterality Date     C NONSPECIFIC PROCEDURE      Rt ankle fx-pins     C NONSPECIFIC PROCEDURE      appendectomy  "    COLONOSCOPY N/A 1/7/2015    Procedure: COLONOSCOPY;  Surgeon: Anna Holbrook MD;  Location:  GI       Social History   Substance Use Topics     Smoking status: Never Smoker     Smokeless tobacco: Never Used     Alcohol use No     Family History   Problem Relation Age of Onset     DIABETES Father      DIABETES Mother          Current Outpatient Prescriptions   Medication Sig Dispense Refill     valsartan-hydrochlorothiazide (DIOVAN-HCT) 160-25 MG per tablet Take 1 tablet by mouth daily 90 tablet 1     simvastatin (ZOCOR) 40 MG tablet TAKE ONE TABLET BY MOUTH AT BEDTIME 90 tablet 0     indomethacin (INDOCIN) 50 MG capsule Take 1 capsule (50 mg) by mouth 3 times daily (with meals) 42 capsule 1     order for DME Equipment being ordered: compression stockings 1 Units 0     albuterol (PROAIR HFA, PROVENTIL HFA, VENTOLIN HFA) 108 (90 BASE) MCG/ACT inhaler Inhale 2 puffs into the lungs every 4 hours as needed 3 Inhaler 3     [DISCONTINUED] valsartan-hydrochlorothiazide (DIOVAN-HCT) 80-12.5 MG per tablet TAKE ONE TABLET BY MOUTH ONCE DAILY 90 tablet 0     Allergies   Allergen Reactions     No Known Drug Allergies        Reviewed and updated as needed this visit by clinical staff  Tobacco  Allergies  Fam Hx  Soc Hx      Reviewed and updated as needed this visit by Provider         ROS:  C: NEGATIVE for fever, chills, change in weight  E/M: NEGATIVE for ear, mouth and throat problems  CV: NEGATIVE for chest pain, palpitations or peripheral edema    OBJECTIVE:                                                    /86 (BP Location: Right arm, Patient Position: Chair, Cuff Size: Adult Large)  Pulse 66  Temp 98.7  F (37.1  C) (Oral)  Resp 16  Ht 5' 9\" (1.753 m)  Wt 269 lb (122 kg)  SpO2 97%  BMI 39.72 kg/m2  Body mass index is 39.72 kg/(m^2).  GENERAL: healthy, alert and no distress  NECK: no adenopathy, no asymmetry, masses, or scars and thyroid normal to palpation  RESP: lungs clear to auscultation - no " rales, rhonchi or wheezes  CV: regular rate and rhythm, normal S1 S2, no S3 or S4, no murmur, click or rub, no peripheral edema and peripheral pulses strong  ABDOMEN: soft, nontender, no hepatosplenomegaly, no masses and bowel sounds normal  MS: no gross musculoskeletal defects noted, no edema         ASSESSMENT/PLAN:                                                            1. HTN, goal below 140/90  Not controlled, incresae medicine to   - Lipid panel reflex to direct LDL  - valsartan-hydrochlorothiazide (DIOVAN-HCT) 160-25 MG per tablet; Take 1 tablet by mouth daily  Dispense: 90 tablet; Refill: 1  - Basic metabolic panel    2. Berylliosis (H)  Will schedule for   - DX Hip/Pelvis/Spine; Future  - Echocardiogram Complete; Future  According to [place] pulmonary clinic.    3. Need for pneumococcal vaccination  Requested by pulmonary.  - PNEUMOCOCCAL CONJ VACCINE 13 VALENT IM    4. Morbid obesity due to excess calories (H)  Talked about diet and exercise.      rosina kurtz in 2 weeks for BP check.    Dimitris Bansal MD  [facility]

## 2017-05-13 LAB
ANION GAP SERPL CALCULATED.3IONS-SCNC: 6 MMOL/L (ref 3–14)
BUN SERPL-MCNC: 14 MG/DL (ref 7–30)
CALCIUM SERPL-MCNC: 9 MG/DL (ref 8.5–10.1)
CHLORIDE SERPL-SCNC: 112 MMOL/L (ref 94–109)
CHOLEST SERPL-MCNC: 186 MG/DL
CO2 SERPL-SCNC: 26 MMOL/L (ref 20–32)
CREAT SERPL-MCNC: 1.07 MG/DL (ref 0.66–1.25)
GFR SERPL CREATININE-BSD FRML MDRD: 72 ML/MIN/1.7M2
GLUCOSE SERPL-MCNC: 100 MG/DL (ref 70–99)
HDLC SERPL-MCNC: 43 MG/DL
LDLC SERPL CALC-MCNC: 118 MG/DL
NONHDLC SERPL-MCNC: 143 MG/DL
POTASSIUM SERPL-SCNC: 4.2 MMOL/L (ref 3.4–5.3)
SODIUM SERPL-SCNC: 144 MMOL/L (ref 133–144)
TRIGL SERPL-MCNC: 127 MG/DL

## 2017-05-15 ENCOUNTER — RADIANT APPOINTMENT (OUTPATIENT)
Dept: BONE DENSITY | Facility: CLINIC | Age: 52
End: 2017-05-15
Payer: COMMERCIAL

## 2017-05-15 DIAGNOSIS — J63.2: ICD-10-CM

## 2017-05-15 PROCEDURE — 77080 DXA BONE DENSITY AXIAL: CPT | Performed by: FAMILY MEDICINE

## 2017-05-26 ENCOUNTER — OFFICE VISIT (OUTPATIENT)
Dept: FAMILY MEDICINE | Facility: CLINIC | Age: 52
End: 2017-05-26
Payer: COMMERCIAL

## 2017-05-26 ENCOUNTER — HOSPITAL ENCOUNTER (OUTPATIENT)
Dept: CARDIOLOGY | Facility: CLINIC | Age: 52
Discharge: HOME | End: 2017-05-26
Attending: FAMILY MEDICINE | Admitting: FAMILY MEDICINE
Payer: OTHER MISCELLANEOUS

## 2017-05-26 VITALS
HEART RATE: 63 BPM | TEMPERATURE: 98.6 F | RESPIRATION RATE: 16 BRPM | OXYGEN SATURATION: 98 % | HEIGHT: 69 IN | DIASTOLIC BLOOD PRESSURE: 81 MMHG | BODY MASS INDEX: 38.8 KG/M2 | SYSTOLIC BLOOD PRESSURE: 124 MMHG | WEIGHT: 262 LBS

## 2017-05-26 DIAGNOSIS — J63.2: ICD-10-CM

## 2017-05-26 DIAGNOSIS — I10 HTN, GOAL BELOW 140/90: ICD-10-CM

## 2017-05-26 PROCEDURE — 99213 OFFICE O/P EST LOW 20 MIN: CPT | Performed by: FAMILY MEDICINE

## 2017-05-26 PROCEDURE — 93306 TTE W/DOPPLER COMPLETE: CPT | Mod: 26 | Performed by: INTERNAL MEDICINE

## 2017-05-26 PROCEDURE — 25500064 ZZH RX 255 OP 636: Performed by: FAMILY MEDICINE

## 2017-05-26 PROCEDURE — 40000264 ECHO COMPLETE WITH OPTISON

## 2017-05-26 RX ADMIN — HUMAN ALBUMIN MICROSPHERES AND PERFLUTREN 3 ML: 10; .22 INJECTION, SOLUTION INTRAVENOUS at 14:40

## 2017-05-26 NOTE — PROGRESS NOTES
SUBJECTIVE:                                                    Florentino Gallegos is a 52 year old male who presents to clinic today for the following health issues:      Hypertension Follow-up      Outpatient blood pressures are not being checked.    Low Salt Diet: no added salt       Amount of exercise or physical activity: 6-7 days/week for an average of 45-60 minutes    Problems taking medications regularly: No    Medication side effects: none    Diet: regular (no restrictions)          Problem list and histories reviewed & adjusted, as indicated.  Additional history: discuss his results for dexascan.    Patient Active Problem List   Diagnosis     HYPERLIPIDEMIA LDL GOAL <130     HTN, goal below 140/90     Acute gouty arthritis     Low back pain     Abnormal chest CT     Berylliosis (H)     Steroid-induced diabetes mellitus (H)     Morbid obesity due to excess calories (H)     Past Surgical History:   Procedure Laterality Date     C NONSPECIFIC PROCEDURE      Rt ankle fx-pins     C NONSPECIFIC PROCEDURE      appendectomy     COLONOSCOPY N/A 1/7/2015    Procedure: COLONOSCOPY;  Surgeon: Anna Holbrook MD;  Location:  GI       Social History   Substance Use Topics     Smoking status: Never Smoker     Smokeless tobacco: Never Used     Alcohol use No     Family History   Problem Relation Age of Onset     DIABETES Father      DIABETES Mother          Current Outpatient Prescriptions   Medication Sig Dispense Refill     valsartan-hydrochlorothiazide (DIOVAN-HCT) 160-25 MG per tablet Take 1 tablet by mouth daily 90 tablet 1     simvastatin (ZOCOR) 40 MG tablet TAKE ONE TABLET BY MOUTH AT BEDTIME 90 tablet 0     indomethacin (INDOCIN) 50 MG capsule Take 1 capsule (50 mg) by mouth 3 times daily (with meals) 42 capsule 1     order for DME Equipment being ordered: compression stockings 1 Units 0     albuterol (PROAIR HFA, PROVENTIL HFA, VENTOLIN HFA) 108 (90 BASE) MCG/ACT inhaler Inhale 2 puffs into the lungs  "every 4 hours as needed 3 Inhaler 3     Allergies   Allergen Reactions     No Known Drug Allergies        Reviewed and updated as needed this visit by clinical staff  Tobacco  Allergies  Fam Hx  Soc Hx      Reviewed and updated as needed this visit by Provider         ROS:  C: NEGATIVE for fever, chills, change in weight  R: NEGATIVE for significant cough or SOB  CV: NEGATIVE for chest pain, palpitations or peripheral edema    OBJECTIVE:                                                    /81 (BP Location: Right arm, Patient Position: Chair, Cuff Size: Adult Large)  Pulse 63  Temp 98.6  F (37  C) (Oral)  Resp 16  Ht 5' 9\" (1.753 m)  Wt 262 lb (118.8 kg)  SpO2 98%  BMI 38.69 kg/m2  Body mass index is 38.69 kg/(m^2).  GENERAL: healthy, alert and no distress  NECK: no adenopathy, no asymmetry, masses, or scars and thyroid normal to palpation  RESP: lungs clear to auscultation - no rales, rhonchi or wheezes  CV: regular rate and rhythm, normal S1 S2, no S3 or S4, no murmur, click or rub, no peripheral edema and peripheral pulses strong  ABDOMEN: soft, nontender, no hepatosplenomegaly, no masses and bowel sounds normal  MS: no gross musculoskeletal defects noted, no edema         ASSESSMENT/PLAN:                                                            1. HTN, goal below 140/90  Well controlled, continue on same medication.s    Discuss all his lab results and Xray reports (dexa scan)  Pt to follow up with [place] clinic for his berylliosis.          Dimitris Bansal MD  [facility]\    "

## 2017-05-26 NOTE — NURSING NOTE
"Chief Complaint   Patient presents with     Hypertension       Initial /81 (BP Location: Right arm, Patient Position: Chair, Cuff Size: Adult Large)  Pulse 63  Temp 98.6  F (37  C) (Oral)  Resp 16  Ht 5' 9\" (1.753 m)  Wt 262 lb (118.8 kg)  SpO2 98%  BMI 38.69 kg/m2 Estimated body mass index is 38.69 kg/(m^2) as calculated from the following:    Height as of this encounter: 5' 9\" (1.753 m).    Weight as of this encounter: 262 lb (118.8 kg).  Medication Reconciliation: complete   Hermelinda Sharp, [place]      "

## 2017-05-26 NOTE — MR AVS SNAPSHOT
After Visit Summary   5/26/2017    Florentino Gallegos    MRN: [id number]           Patient Information     Date Of Birth          1965        Visit Information        Provider Department      5/26/2017 9:30 AM Dimitris Bansal MD [facility]        Today's Diagnoses     HTN, goal below 140/90           Follow-ups after your visit        Your next 10 appointments already scheduled     May 26, 2017  2:00 PM CDT   Ech Complete with [id number]   [facility])    [address]  [facility] [id number] [phone]           1.  Please bring or wear a comfortable two-piece outfit. 2.  You may eat, drink and take your normal medicines. 3.  For any questions that cannot be answered, please contact the ordering physician ***Please check-in at the [place] Registration Office located in [address] in the [facility] building. When you are finished registering, please go to [address] and have a seat. The technician will call your name for the test.              Who to contact     If you have questions or need follow up information about today's clinic visit or your schedule please contact [facility] directly at [phone].  Normal or non-critical lab and imaging results will be communicated to you by MyChart, letter or phone within 4 business days after the clinic has received the results. If you do not hear from us within 7 days, please contact the clinic through [employer] or phone. If you have a critical or abnormal lab result, we will notify you by phone as soon as possible.  Submit refill requests through [employer] or call your pharmacy and they will forward the refill request to us. Please allow 3 business days for your refill to be completed.          Additional Information About Your Visit        [employer] Information     [employer] lets you send messages to your doctor, view your test results,  "renew your prescriptions, schedule appointments and more. To sign up, go to [email or web address] . Click on \"Log in\" on the left side of the screen, which will take you to the Welcome page. Then click on \"Sign up Now\" on the right side of the page.     You will be asked to enter the access code listed below, as well as some personal information. Please follow the directions to create your username and password.     Your access code is: [email or web address]  Expires: 8/10/2017 10:45 AM     Your access code will  in 90 days. If you need help or a new code, please call your [facility] or [phone].        Care EveryWhere ID     This is your Care EveryWhere ID. This could be used by other organizations to access your [facility] medical records  [id number]        Your Vitals Were     Pulse Temperature Respirations Height Pulse Oximetry BMI (Body Mass Index)    63 98.6  F (37  C) (Oral) 16 5' 9\" (1.753 m) 98% 38.69 kg/m2       Blood Pressure from Last 3 Encounters:   17 124/81   17 152/86   02/15/17 114/70    Weight from Last 3 Encounters:   17 262 lb (118.8 kg)   17 269 lb (122 kg)   02/15/17 269 lb (122 kg)              Today, you had the following     No orders found for display       Primary Care Provider Office Phone # Fax #    EVAN Lopez [phone]       [facility] [address] [facility] [address]        Thank you!     Thank you for choosing [facility]  for your care. Our goal is always to provide you with excellent care. Hearing back from our patients is one way we can continue to improve our services. Please take a few minutes to complete the written survey that you may receive in the mail after your visit with us. Thank you!             Your Updated Medication List - Protect others around you: Learn how to safely use, store and throw away your medicines at www.disposemymeds.org.          This list is accurate as of: " 5/26/17  9:56 AM.  Always use your most recent med list.                   Brand Name Dispense Instructions for use    albuterol 108 (90 BASE) MCG/ACT Inhaler    PROAIR HFA/PROVENTIL HFA/VENTOLIN HFA    3 Inhaler    Inhale 2 puffs into the lungs every 4 hours as needed       indomethacin 50 MG capsule    INDOCIN    42 capsule    Take 1 capsule (50 mg) by mouth 3 times daily (with meals)       order for DME     1 Units    Equipment being ordered: compression stockings       simvastatin 40 MG tablet    ZOCOR    90 tablet    TAKE ONE TABLET BY MOUTH AT BEDTIME       valsartan-hydrochlorothiazide 160-25 MG per tablet    DIOVAN-HCT    90 tablet    Take 1 tablet by mouth daily

## 2017-06-20 ENCOUNTER — TRANSFERRED RECORDS (OUTPATIENT)
Dept: HEALTH INFORMATION MANAGEMENT | Facility: CLINIC | Age: 52
End: 2017-06-20

## 2017-07-14 ENCOUNTER — TRANSFERRED RECORDS (OUTPATIENT)
Dept: HEALTH INFORMATION MANAGEMENT | Facility: CLINIC | Age: 52
End: 2017-07-14

## 2017-07-25 ENCOUNTER — HOSPITAL ENCOUNTER (OUTPATIENT)
Dept: SPEECH THERAPY | Facility: CLINIC | Age: 52
Setting detail: THERAPIES SERIES
End: 2017-07-25
Payer: OTHER MISCELLANEOUS

## 2017-07-25 PROCEDURE — 40000211 ZZHC STATISTIC SLP  DEPARTMENT VISIT: Performed by: SPEECH-LANGUAGE PATHOLOGIST

## 2017-07-25 PROCEDURE — 92524 BEHAVRAL QUALIT ANALYS VOICE: CPT | Mod: GN | Performed by: SPEECH-LANGUAGE PATHOLOGIST

## 2017-07-25 NOTE — PROGRESS NOTES
[facility] OP VOICE EVALUATION   07/25/17 0800       Present No   General Information   Type Of Visit Initial   Start Of Care Date 07/25/17   Referring Physician Dr. Darek MD    Orders Evaluate And Treat   Orders Comment Paradoxical volcal chord dysfunction, severe cough.    Medical Diagnosis Berylliosis    Onset Of Illness/injury Or Date Of Surgery 07/12/17  (per MD order date. )   Precautions/Limitations no known precautions/limitations   Hearing WFL   Avocational voice uses None Pt on workers comp, not working since April, 2016    Surgical/Medical history reviewed Yes   Pertinent History Of Current Problem Mr. Gallegos is a 52 y.o. amle who is here today for a voice evaluation due to his vocal cord dysfunction (VCD). 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 Pt has been diagnosed with chronic berylliosis as the underlying cause of his VCD and chronic productive cough. Pt's last day of work occurred in April of 2016, he was only able to complete 2 hours of work before sypmtoms became severe. Symptoms include shortness of breath, choking sensations, muscle tension, and chronic coughing. Pt reports using a inhaler and drinking generous amounts of water on a daily basis, both of which seem to reduce symptoms. 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 As a result Pt was referred for an OP SLP Voice Evaluation for assessment and recommendations to manage this dysfunction.    Prior Level of Functioning Same problem relapsing/remitting.  (As noted over the course of exposure to contaminants. )   Prior Level Of Function Comment Pt employed at a foundry for 32 years with extended exposure to beryllium and other harmful substances.    Current Community Support  Family/friend caregiver   Patient  "Role/employment History Disabled;Employed  (Workers comp. )   Living environment House/[facility]   General Observations Pt is well groomed, healthy male.    Patient/family Goals \"I would like to breath easier, and get back [place]    General Information Comments Pt reports [place] and [place], (Pt originally from [place], [place]). Pt reports most difficulty breathing on humid days, when in the shower, and is greatly affected by smells including; perfumes and gasoline. 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    FALL RISK SCREEN   Have you fallen 2 or more times in the last year? No   Have you fallen and had an injury in the past year? No   Is the patient a fall risk? No   Comments SLP: Pt has not been identified as a fall risk at this time.    Pain Assessment   Pain Reported No   Personal Rating / Voice Use Rating   Describe the amount of voice use required for your job Moderate   Describe the intensity of voice use required for your job Moderate   Describe the amount of typical non-work voice use Moderate   Describe the intensity of typical non-work voice use Moderate   Comments SLP: Pt reports singing frequently throghout the day.    Voice Profile during conversation, 1 min monologue and paragraph reading   Voice Quality Hoarse;Scratchy  (Pt reports his wife has commented that he sounds \"hoarse.\" )   Voice quality severity rating continuum (1=Severe, 7=WNL) 5   Breath control Tight;Tense   Breath Control comments thoracic but slight emerget to more diaphragmatic.    Breath control severity rating continuum (1=Severe, 7=WNL) 4   Pitch /Frequency Description WNL   Volume comments Normal loudness level.    Neuromuscular Control WNL   Resonance WNL   Vibratory Function of Vocal Folds   Prolonged 'ah' at mid pitch (sec) 11   Prolonged 'ah' at high pitch (sec) 4.5 " "  Prolonged 'ah' at low pitch (sec) 7   Vibratory Function of Vocal Folds Scale Males 20 - 80 yrs: 15 - 25 secs   Vibratory Function of Vocal Folds Comments Reduced/impaired, Pt presented with severe coughing following all phonations.    Mucosal Function of the Vocal Folds   S/Z ratio (__ sec/ __sec = __percentage) 0.44   Vocal fold stiffness / swelling test Negative for stiffness/swelling   Unilateral Larynx Function   Alternating head turns to right and left Easier phonation to left   Comments SLP: Pt reports pain/discomfort/sorness when turning head to the right. However, vocal quality sounds the same for head turn to the right and left.    Respiratory Function Screening   Longest prolonged \"S\" from S/Z ratio (#of sec) 4   Longest prolonged \"S\" characteristics Breathing struggle;Other  (Profuse coughing following phonation. 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 Pt recently seen by MD at [facility] ([facility]) with a diagnosis of PVFM/VCD secondary to chronic berylliosis and severe cough. He was referred for an OP voice evaluation to further assess VF movement, and to establish a POC with strategies including relaxation and breathing exercises to reduce severity and frequency of symptom occurrence for improved breathing as needed for speech and quality of life. He will benefit from outpatient SLP services at this location for management of PFVM for successful return to increasingly strenuous exercise, ability to live at his home in near the [facility] more comfortably, and for completion " of all activities of daily living (i.e. showering, and pumping gas in his own car.)    Frequency and Duration 1x/week for 4-6 sessiosn pending progress.    Prognosis  Fair due to pending medical interventions   Prognosis comments Pt most likely has sustained perminent tissue damage (lungs) d/t repeated and extended exposure to harmful substances. Pt may always present with cough and soreness, however, Pt prognosis is fair for management for VCD/PVFM with relaxation techniques and breathing exercises.    Risks and Benefits of Treatment have been explained. Yes   Patient & /or Caregiver  in agreement with plan of care Yes   Patient Education Education provided regarding anatomy and physiology of laryngeal mechanism as it relates to his condition and VCD exercises.    Educational Assessment   Barriers to Learning No barriers   Preferred Learning Style Listening;Reading;Demonstration;Pictures / Video   Voice Goals   Voice Goals 1;2;3;4;5   Voice Goal 1   Goal Identifier STG 1-Voice    Goal Description Pt will implement trained breathing techniques to prevent and/or control PVFM with 80% accuracy in order to shower without profuse coughing.    Target Date 10/31/17   Voice Goal 2   Goal Identifier STG 2-Voice   Goal Description Pt will ID triggers of his PVFM and identify strategies to controla nd reduce his symptoms of VCD/PVFM with 80% accuracy in order to breath easily during daily walks at a distance greater than 50 yards per Pt report.    Target Date 10/31/17   Voice Goal 3   Goal Identifier STG 3-Voice   Goal Description Pt will complete diaphragmatic breathing exercises in therapy sessions with 95% accuracy and min A in order to independenly utilize exercises when encountering strong odors in the community (i.e. perfume, gasoline).    Target Date 10/31/17   Voice Goal 4   Goal Identifier STG 4-Voice    Goal Description Pt will impliment cough control breathing technique to prefent and/or interrupt cough before,  during, and after exposure to known trigger with 80% accuracy provided min A in order to reduce tissue irritation on a daily basis for improved vocal quality when communicating with family.    Target Date 10/31/17   Voice Goal 5   Goal Identifier STG 5: HEP    Goal Description Pt will complete a home exercise program (HEP) geared towards the above goals independently for carry over and generalization of all skills across environments.    Target Date 10/31/17   Total Session Time   Total Session Time 40    Total Evaluation Time 40        Thank you for referring Good El to outpatient therapy at [facility] in [place].  Please call Karen Bhakta MA, SLP-CCC at [phone] or email [email or web address] with any questions or concerns.      Karen Bhakta M.A., SLP-CCC  Speech-Language Pathologist

## 2017-08-15 ENCOUNTER — HOSPITAL ENCOUNTER (OUTPATIENT)
Dept: SPEECH THERAPY | Facility: CLINIC | Age: 52
Setting detail: THERAPIES SERIES
End: 2017-08-15
Payer: OTHER MISCELLANEOUS

## 2017-08-15 PROCEDURE — 92507 TX SP LANG VOICE COMM INDIV: CPT | Mod: GN | Performed by: SPEECH-LANGUAGE PATHOLOGIST

## 2017-08-15 PROCEDURE — 40000211 ZZHC STATISTIC SLP  DEPARTMENT VISIT: Performed by: SPEECH-LANGUAGE PATHOLOGIST

## 2017-08-17 ENCOUNTER — TRANSFERRED RECORDS (OUTPATIENT)
Dept: HEALTH INFORMATION MANAGEMENT | Facility: CLINIC | Age: 52
End: 2017-08-17

## 2017-08-23 DIAGNOSIS — J63.2: Primary | ICD-10-CM

## 2017-08-23 DIAGNOSIS — E78.5 HYPERLIPIDEMIA LDL GOAL <130: ICD-10-CM

## 2017-08-23 PROCEDURE — 36415 COLL VENOUS BLD VENIPUNCTURE: CPT

## 2017-08-23 PROCEDURE — 86480 TB TEST CELL IMMUN MEASURE: CPT

## 2017-08-23 RX ORDER — SIMVASTATIN 40 MG
TABLET ORAL
Qty: 90 TABLET | Refills: 0 | Status: SHIPPED | OUTPATIENT
Start: 2017-08-23 | End: 2017-11-25

## 2017-08-23 NOTE — TELEPHONE ENCOUNTER
Simvastatin     Last Written Prescription Date: 5/03/2017  Last Fill Quantity: 90, # refills: 0  Last Office Visit with [facility] or [facility] prescribing provider: 5/26/2017       Lab Results   Component Value Date    CHOL 186 05/12/2017     Lab Results   Component Value Date    HDL 43 05/12/2017     Lab Results   Component Value Date     05/12/2017     Lab Results   Component Value Date    TRIG 127 05/12/2017     Lab Results   Component Value Date    CHOLHDLRATIO 3.6 12/15/2014

## 2017-08-25 LAB
M TB TUBERC IFN-G BLD QL: NEGATIVE
M TB TUBERC IFN-G/MITOGEN IGNF BLD: 0 IU/ML

## 2017-08-31 ENCOUNTER — HOSPITAL ENCOUNTER (OUTPATIENT)
Dept: SPEECH THERAPY | Facility: CLINIC | Age: 52
Setting detail: THERAPIES SERIES
End: 2017-08-31
Payer: OTHER MISCELLANEOUS

## 2017-08-31 PROCEDURE — 92507 TX SP LANG VOICE COMM INDIV: CPT | Mod: GN | Performed by: SPEECH-LANGUAGE PATHOLOGIST

## 2017-08-31 PROCEDURE — 40000211 ZZHC STATISTIC SLP  DEPARTMENT VISIT: Performed by: SPEECH-LANGUAGE PATHOLOGIST

## 2017-09-01 ENCOUNTER — TRANSFERRED RECORDS (OUTPATIENT)
Dept: HEALTH INFORMATION MANAGEMENT | Facility: CLINIC | Age: 52
End: 2017-09-01

## 2017-09-05 ENCOUNTER — HOSPITAL ENCOUNTER (OUTPATIENT)
Dept: SPEECH THERAPY | Facility: CLINIC | Age: 52
Setting detail: THERAPIES SERIES
End: 2017-09-05
Payer: OTHER MISCELLANEOUS

## 2017-09-05 PROCEDURE — 40000211 ZZHC STATISTIC SLP  DEPARTMENT VISIT: Performed by: SPEECH-LANGUAGE PATHOLOGIST

## 2017-09-05 PROCEDURE — 92507 TX SP LANG VOICE COMM INDIV: CPT | Mod: GN | Performed by: SPEECH-LANGUAGE PATHOLOGIST

## 2017-09-18 ENCOUNTER — HOSPITAL ENCOUNTER (OUTPATIENT)
Dept: SPEECH THERAPY | Facility: CLINIC | Age: 52
Setting detail: THERAPIES SERIES
End: 2017-09-18
Payer: OTHER MISCELLANEOUS

## 2017-09-18 PROCEDURE — 40000211 ZZHC STATISTIC SLP  DEPARTMENT VISIT: Performed by: SPEECH-LANGUAGE PATHOLOGIST

## 2017-09-18 PROCEDURE — 92507 TX SP LANG VOICE COMM INDIV: CPT | Mod: GN | Performed by: SPEECH-LANGUAGE PATHOLOGIST

## 2017-09-18 NOTE — PROGRESS NOTES
Outpatient Speech Language Pathology Discharge Note     Patient: Florentino Gallegos  : 1965    Beginning/End Dates of Reporting Period:  2017 to 2017    Referring Provider: Dr. 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 At this time Pt has met his goals and is appropriate for d/c. Pt and treating speech language pathologist are in agreement with this plan.     Objective Measurements:                   Goals:  Goal Identifier STG 1-Voice    Goal Description Pt will implement trained breathing techniques to prevent and/or control PVFM with 80% accuracy in order to shower without profuse coughing.    Target Date 10/31/17   Date Met  2017   Progress: Goal met. Pt independently using breathing compensations w/o cueing to prevent and control PVFM with 80% accuracy.      Goal Identifier STG 2-Voice   Goal Description Pt will ID triggers of his PVFM and identify strategies to controla nd reduce his symptoms of VCD/PVFM with 80% accuracy in order to breath easily during daily walks at a distance greater than 50 yards per Pt report.    Target Date 10/31/17   Date Met   2017   Progress: Goal met. Pt is able to ID triggers (i.e. Pollution, car exhaust, perfume, dust ext) and ID strategies to reduce and control symptoms (i.e. Avoidance, use of a mask, inhaler, drinking water, using PVFM breathing exercises) independently.      Goal Identifier STG 3-Voice   Goal Description Pt will complete diaphragmatic breathing exercises in therapy sessions with 95% accuracy and min A in order to independenly utilize exercises when encountering strong odors in the community (i.e. perfume, gasoline).    Target Date 10/31/17   Date  Met   9/18/2017   Progress: Goal met. Pt is able to use exercises independently w/o cueing with % accuracy.      Goal Identifier STG 4-Voice    Goal Description Pt will impliment cough control breathing technique to prefent and/or interrupt cough before, during, and after exposure to known trigger with 80% accuracy provided min A in order to reduce tissue irritation on a daily basis for improved vocal quality when communicating with family.    Target Date 10/31/17   Date Met      Progress: Goal met. Pt frequently using water or inhaler as compensation for prevention with at least 80% accuracy provided 0-min cues.      Goal Identifier STG 5: HEP    Goal Description Pt will complete a home exercise program (HEP) geared towards the above goals independently for carry over and generalization of all skills across environments.    Target Date 10/31/17   Date Met   9/18/2017   Progress: Goal met. Pt compliant with all breathing exercises, recommend Pt generate or continue to utilize journal documenting triggers, occurrences when exposed, beneficial strategies and recovery time. Pt to continue using breathing exercises to reduce PVFM PRN.      Progress Toward Goals:    Progress this reporting period: Pt has met all goals and is appropriate for d/c.     Plan:  Discharge from therapy.    Discharge: Yes     Reason for Discharge: Patient has met all goals.      Discharge Plan: Patient to continue home program.    Thank you for referring Florentino Gallegos to outpatient therapy at [facility] in [place]. It has been a pleasure working with him towards his goals. Please call Karen Bhakta MA, SLP-CCC at [phone] or email [email or web address] with any questions or concerns.      Karen Bhakta M.A., SLP-CCC  Speech-Language Pathologist

## 2017-10-03 ENCOUNTER — TRANSFERRED RECORDS (OUTPATIENT)
Dept: HEALTH INFORMATION MANAGEMENT | Facility: CLINIC | Age: 52
End: 2017-10-03

## 2017-10-05 DIAGNOSIS — Z51.81 ENCOUNTER FOR THERAPEUTIC DRUG MONITORING: ICD-10-CM

## 2017-10-05 DIAGNOSIS — J63.2: Primary | ICD-10-CM

## 2017-10-09 DIAGNOSIS — Z51.81 ENCOUNTER FOR THERAPEUTIC DRUG MONITORING: ICD-10-CM

## 2017-10-09 DIAGNOSIS — J63.2: ICD-10-CM

## 2017-10-09 LAB
ALBUMIN SERPL-MCNC: 3.5 G/DL (ref 3.4–5)
ALP SERPL-CCNC: 90 U/L (ref 40–150)
ALT SERPL W P-5'-P-CCNC: 28 U/L (ref 0–70)
AST SERPL W P-5'-P-CCNC: 17 U/L (ref 0–45)
BASOPHILS # BLD AUTO: 0 10E9/L (ref 0–0.2)
BASOPHILS NFR BLD AUTO: 0.7 %
BILIRUB DIRECT SERPL-MCNC: 0.1 MG/DL (ref 0–0.2)
BILIRUB SERPL-MCNC: 0.6 MG/DL (ref 0.2–1.3)
DIFFERENTIAL METHOD BLD: ABNORMAL
EOSINOPHIL # BLD AUTO: 0.2 10E9/L (ref 0–0.7)
EOSINOPHIL NFR BLD AUTO: 3.6 %
ERYTHROCYTE [DISTWIDTH] IN BLOOD BY AUTOMATED COUNT: 13.7 % (ref 10–15)
HCT VFR BLD AUTO: 42.6 % (ref 40–53)
HGB BLD-MCNC: 14.2 G/DL (ref 13.3–17.7)
LYMPHOCYTES # BLD AUTO: 0.7 10E9/L (ref 0.8–5.3)
LYMPHOCYTES NFR BLD AUTO: 11.8 %
MCH RBC QN AUTO: 27.3 PG (ref 26.5–33)
MCHC RBC AUTO-ENTMCNC: 33.3 G/DL (ref 31.5–36.5)
MCV RBC AUTO: 82 FL (ref 78–100)
MONOCYTES # BLD AUTO: 0.5 10E9/L (ref 0–1.3)
MONOCYTES NFR BLD AUTO: 8.2 %
NEUTROPHILS # BLD AUTO: 4.6 10E9/L (ref 1.6–8.3)
NEUTROPHILS NFR BLD AUTO: 75.7 %
PLATELET # BLD AUTO: 245 10E9/L (ref 150–450)
PROT SERPL-MCNC: 7.5 G/DL (ref 6.8–8.8)
RBC # BLD AUTO: 5.2 10E12/L (ref 4.4–5.9)
WBC # BLD AUTO: 6.1 10E9/L (ref 4–11)

## 2017-10-09 PROCEDURE — 36415 COLL VENOUS BLD VENIPUNCTURE: CPT | Performed by: INTERNAL MEDICINE

## 2017-10-09 PROCEDURE — 85025 COMPLETE CBC W/AUTO DIFF WBC: CPT | Performed by: INTERNAL MEDICINE

## 2017-10-09 PROCEDURE — 80076 HEPATIC FUNCTION PANEL: CPT | Performed by: INTERNAL MEDICINE

## 2017-11-25 DIAGNOSIS — I10 HTN, GOAL BELOW 140/90: ICD-10-CM

## 2017-11-25 DIAGNOSIS — E78.5 HYPERLIPIDEMIA LDL GOAL <130: ICD-10-CM

## 2017-11-28 RX ORDER — SIMVASTATIN 40 MG
TABLET ORAL
Qty: 90 TABLET | Refills: 0 | Status: SHIPPED | OUTPATIENT
Start: 2017-11-28 | End: 2017-12-11

## 2017-11-28 RX ORDER — VALSARTAN AND HYDROCHLOROTHIAZIDE 160; 25 MG/1; MG/1
TABLET ORAL
Qty: 90 TABLET | Refills: 1 | Status: SHIPPED | OUTPATIENT
Start: 2017-11-28 | End: 2017-12-11

## 2017-11-28 NOTE — TELEPHONE ENCOUNTER
Routing refill request to provider for review/approval because:  Labs out of range:  LDL    Valerie Knowles, RN  [facility]

## 2017-11-28 NOTE — TELEPHONE ENCOUNTER
Requested Prescriptions   Pending Prescriptions Disp Refills     simvastatin (ZOCOR) 40 MG tablet [Pharmacy Med Name: SIMVASTATIN 40MG TABS] 90 tablet 0     Sig: TAKE ONE TABLET BY MOUTH AT BEDTIME    Statins Protocol Passed    11/27/2017  8:13 AM       Passed - LDL on file in past 12 months    Recent Labs   Lab Test  05/12/17   1054   LDL  118*            Passed - No abnormal creatine kinase in past 12 months    No lab results found.         Passed - Recent or future visit with authorizing provider    Patient had office visit in the last year or has a visit in the next 30 days with authorizing provider.  See chart review.              Passed - Patient is age 18 or older

## 2017-12-11 ENCOUNTER — OFFICE VISIT (OUTPATIENT)
Dept: FAMILY MEDICINE | Facility: CLINIC | Age: 52
End: 2017-12-11
Payer: COMMERCIAL

## 2017-12-11 VITALS
BODY MASS INDEX: 40.58 KG/M2 | RESPIRATION RATE: 16 BRPM | OXYGEN SATURATION: 95 % | TEMPERATURE: 99 F | DIASTOLIC BLOOD PRESSURE: 87 MMHG | WEIGHT: 274 LBS | HEIGHT: 69 IN | HEART RATE: 63 BPM | SYSTOLIC BLOOD PRESSURE: 139 MMHG

## 2017-12-11 DIAGNOSIS — I10 HTN, GOAL BELOW 140/90: ICD-10-CM

## 2017-12-11 DIAGNOSIS — E78.5 HYPERLIPIDEMIA LDL GOAL <130: ICD-10-CM

## 2017-12-11 DIAGNOSIS — Z23 NEED FOR PROPHYLACTIC VACCINATION AND INOCULATION AGAINST INFLUENZA: ICD-10-CM

## 2017-12-11 DIAGNOSIS — J63.2: Primary | ICD-10-CM

## 2017-12-11 PROBLEM — F41.1 GAD (GENERALIZED ANXIETY DISORDER): Status: ACTIVE | Noted: 2017-12-11

## 2017-12-11 LAB
ANION GAP SERPL CALCULATED.3IONS-SCNC: 9 MMOL/L (ref 3–14)
BUN SERPL-MCNC: 17 MG/DL (ref 7–30)
CALCIUM SERPL-MCNC: 9.5 MG/DL (ref 8.5–10.1)
CHLORIDE SERPL-SCNC: 103 MMOL/L (ref 94–109)
CO2 SERPL-SCNC: 29 MMOL/L (ref 20–32)
CREAT SERPL-MCNC: 1.18 MG/DL (ref 0.66–1.25)
GFR SERPL CREATININE-BSD FRML MDRD: 64 ML/MIN/1.7M2
GLUCOSE SERPL-MCNC: 89 MG/DL (ref 70–99)
HBA1C MFR BLD: 5.4 % (ref 4.3–6)
POTASSIUM SERPL-SCNC: 3.7 MMOL/L (ref 3.4–5.3)
SODIUM SERPL-SCNC: 141 MMOL/L (ref 133–144)

## 2017-12-11 PROCEDURE — 90472 IMMUNIZATION ADMIN EACH ADD: CPT | Performed by: FAMILY MEDICINE

## 2017-12-11 PROCEDURE — 90732 PPSV23 VACC 2 YRS+ SUBQ/IM: CPT | Performed by: FAMILY MEDICINE

## 2017-12-11 PROCEDURE — 90471 IMMUNIZATION ADMIN: CPT | Performed by: FAMILY MEDICINE

## 2017-12-11 PROCEDURE — 90686 IIV4 VACC NO PRSV 0.5 ML IM: CPT | Performed by: FAMILY MEDICINE

## 2017-12-11 PROCEDURE — 83036 HEMOGLOBIN GLYCOSYLATED A1C: CPT | Performed by: FAMILY MEDICINE

## 2017-12-11 PROCEDURE — 80048 BASIC METABOLIC PNL TOTAL CA: CPT | Performed by: FAMILY MEDICINE

## 2017-12-11 PROCEDURE — 99214 OFFICE O/P EST MOD 30 MIN: CPT | Mod: 25 | Performed by: FAMILY MEDICINE

## 2017-12-11 PROCEDURE — 36415 COLL VENOUS BLD VENIPUNCTURE: CPT | Performed by: FAMILY MEDICINE

## 2017-12-11 RX ORDER — VALSARTAN AND HYDROCHLOROTHIAZIDE 160; 25 MG/1; MG/1
1 TABLET ORAL DAILY
Qty: 90 TABLET | Refills: 3 | Status: SHIPPED | OUTPATIENT
Start: 2017-12-11 | End: 2018-08-20 | Stop reason: ALTCHOICE

## 2017-12-11 RX ORDER — BUSPIRONE HYDROCHLORIDE 5 MG/1
5 TABLET ORAL 3 TIMES DAILY
COMMUNITY
End: 2018-10-23

## 2017-12-11 RX ORDER — SIMVASTATIN 40 MG
40 TABLET ORAL AT BEDTIME
Qty: 90 TABLET | Refills: 3 | Status: SHIPPED | OUTPATIENT
Start: 2017-12-11 | End: 2018-12-25

## 2017-12-11 NOTE — PROGRESS NOTES
SUBJECTIVE:   Florentino Gallegos is a 52 year old male who presents to clinic today for the following health issues:      Hypertension Follow-up      Outpatient blood pressures are not being checked.    Low Salt Diet: low salt        Amount of exercise or physical activity: None    Problems taking medications regularly: No    Medication side effects: abdominal pain    Diet: regular (no restrictions)            Problem list and histories reviewed & adjusted, as indicated.  Additional history: pt is still taking prednisone once daily, he is up to 12.5 mg daily of prednisone due to berylliosis, pt is feeling better in general, breathing is improved too.    Patient Active Problem List   Diagnosis     HYPERLIPIDEMIA LDL GOAL <130     HTN, goal below 140/90     Acute gouty arthritis     Low back pain     Abnormal chest CT     Berylliosis (H)     Steroid-induced diabetes mellitus (H)     Morbid obesity due to excess calories (H)     LESLEY (generalized anxiety disorder)     Past Surgical History:   Procedure Laterality Date     C NONSPECIFIC PROCEDURE      Rt ankle fx-pins     C NONSPECIFIC PROCEDURE      appendectomy     COLONOSCOPY N/A 1/7/2015    Procedure: COLONOSCOPY;  Surgeon: Anna Holbrook MD;  Location:  GI       Social History   Substance Use Topics     Smoking status: Never Smoker     Smokeless tobacco: Never Used     Alcohol use No     Family History   Problem Relation Age of Onset     DIABETES Father      DIABETES Mother          Current Outpatient Prescriptions   Medication Sig Dispense Refill     busPIRone (BUSPAR) 2.5 mg half-tab Take 5 mg by mouth 3 times daily       DULOXETINE HCL PO        simvastatin (ZOCOR) 40 MG tablet Take 1 tablet (40 mg) by mouth At Bedtime 90 tablet 3     valsartan-hydrochlorothiazide (DIOVAN-HCT) 160-25 MG per tablet Take 1 tablet by mouth daily 90 tablet 3     order for DME Equipment being ordered: compression stockings 1 Units 0     albuterol (PROAIR HFA, PROVENTIL HFA,  "VENTOLIN HFA) 108 (90 BASE) MCG/ACT inhaler Inhale 2 puffs into the lungs every 4 hours as needed 3 Inhaler 3     [DISCONTINUED] simvastatin (ZOCOR) 40 MG tablet TAKE ONE TABLET BY MOUTH AT BEDTIME 90 tablet 0     [DISCONTINUED] valsartan-hydrochlorothiazide (DIOVAN-HCT) 160-25 MG per tablet TAKE ONE TABLET BY MOUTH ONCE DAILY 90 tablet 1     Allergies   Allergen Reactions     No Known Drug Allergies          Reviewed and updated as needed this visit by clinical staffTobacco  Allergies  Meds  Med Hx  Surg Hx  Fam Hx  Soc Hx      Reviewed and updated as needed this visit by Provider         ROS:  CONSTITUTIONAL:mild weight gain.  CV: NEGATIVE for chest pain, palpitations or peripheral edema    OBJECTIVE:     /87 (BP Location: Right arm, Patient Position: Chair, Cuff Size: Adult Large)  Pulse 63  Temp 99  F (37.2  C) (Oral)  Resp 16  Ht 5' 9\" (1.753 m)  Wt 274 lb (124.3 kg)  SpO2 95%  BMI 40.46 kg/m2  Body mass index is 40.46 kg/(m^2).  GENERAL: healthy, alert and no distress  NECK: no adenopathy, no asymmetry, masses, or scars and thyroid normal to palpation  RESP: lungs clear to auscultation - no rales, rhonchi or wheezes  CV: regular rate and rhythm, normal S1 S2, no S3 or S4, no murmur, click or rub, no peripheral edema and peripheral pulses strong  MS: no gross musculoskeletal defects noted, no edema        ASSESSMENT/PLAN:             1. Need for prophylactic vaccination and inoculation against influenza    - FLU VAC, SPLIT VIRUS IM > 3 YO (QUADRIVALENT) [46874]    2. Berylliosis (H)  [facility] to vacc[facility] with   - Pneumococcal vaccine 23 valent PPSV23  (Pneumovax) [12079]    3. HTN, goal below 140/90  Controlled, continue on   - BASIC METABOLIC PANEL  - HEMOGLOBIN A1C  - valsartan-hydrochlorothiazide (DIOVAN-HCT) 160-25 MG per tablet; Take 1 tablet by mouth daily  Dispense: 90 tablet; Refill: 3    4. Hyperlipidemia LDL goal <130  Today I counseled the patient about " diet, regular exercise and weight loss planning.    - simvastatin (ZOCOR) 40 MG tablet; Take 1 tablet (40 mg) by mouth At Bedtime  Dispense: 90 tablet; Refill: 3    FUTURE APPOINTMENTS:       - Follow-up visit in 1 year.    Dimitris Bansal MD  [facility]    Injectable Influenza Immunization Documentation    1.  Is the person to be vaccinated sick today?   No    2. Does the person to be vaccinated have an allergy to a component   of the vaccine?   No  Egg Allergy Algorithm Link    3. Has the person to be vaccinated ever had a serious reaction   to influenza vaccine in the past?   No    4. Has the person to be vaccinated ever had Guillain-Barré syndrome?   No    Form completed by Hermelinda Sharp [facility]

## 2017-12-11 NOTE — LETTER
December 12, 2017      Florentino Gallegos  [address] [place] [address]        Dear ,    We are writing to inform you of your test results.    Your test results fall within the expected range(s) or remain unchanged from previous results.  Please continue with current treatment plan.    Resulted Orders   BASIC METABOLIC PANEL   Result Value Ref Range    Sodium 141 133 - 144 mmol/L    Potassium 3.7 3.4 - 5.3 mmol/L    Chloride 103 94 - 109 mmol/L    Carbon Dioxide 29 20 - 32 mmol/L    Anion Gap 9 3 - 14 mmol/L    Glucose 89 70 - 99 mg/dL      Comment:      Fasting specimen    Urea Nitrogen 17 7 - 30 mg/dL    Creatinine 1.18 0.66 - 1.25 mg/dL    GFR Estimate 64 >60 mL/min/1.7m2      Comment:      Non  GFR Calc    GFR Estimate If Black 78 >60 mL/min/1.7m2      Comment:       GFR Calc    Calcium 9.5 8.5 - 10.1 mg/dL   HEMOGLOBIN A1C   Result Value Ref Range    Hemoglobin A1C 5.4 4.3 - 6.0 %       If you have any questions or concerns, please call the clinic at the number listed above.       Sincerely,        Dimitris Bansal MD

## 2017-12-11 NOTE — MR AVS SNAPSHOT
"              After Visit Summary   2017    Florentino Gallegos    MRN: [id number]           Patient Information     Date Of Birth          1965        Visit Information        Provider Department      2017 2:30 PM Dimitris Bansal MD [facility]        Today's Diagnoses     Berylliosis (H)    -  1    Need for prophylactic vaccination and inoculation against influenza        HTN, goal below 140/90        Hyperlipidemia LDL goal <130           Follow-ups after your visit        Who to contact     If you have questions or need follow up information about today's clinic visit or your schedule please contact [facility] directly at [phone].  Normal or non-critical lab and imaging results will be communicated to you by MyChart, letter or phone within 4 business days after the clinic has received the results. If you do not hear from us within 7 days, please contact the clinic through MyChart or phone. If you have a critical or abnormal lab result, we will notify you by phone as soon as possible.  Submit refill requests through [employer] [address] or call your pharmacy and they will forward the refill request to us. Please allow 3 business days for your refill to be completed.          Additional Information About Your Visit        MyChart Information     [facility] [address] lets you send messages to your doctor, view your test results, renew your prescriptions, schedule appointments and more. To sign up, go to [email or web address] . Click on \"Log in\" on the left side of the screen, which will take you to the Welcome page. Then click on \"Sign up Now\" on the right side of the page.     You will be asked to enter the access code listed below, as well as some personal information. Please follow the directions to create your username and password.     Your access code is: [email or web address]  Expires: 3/11/2018  3:02 PM     Your access code will  in 90 days. If you need help or a new code, please " "call your [facility] clinic or [phone].        Care EveryWhere ID     This is your Care EveryWhere ID. This could be used by other organizations to access your [facility] medical records  [id number]        Your Vitals Were     Pulse Temperature Respirations Height Pulse Oximetry BMI (Body Mass Index)    63 99  F (37.2  C) (Oral) 16 5' 9\" (1.753 m) 95% 40.46 kg/m2       Blood Pressure from Last 3 Encounters:   12/11/17 139/87   05/26/17 124/81   05/12/17 152/86    Weight from Last 3 Encounters:   12/11/17 274 lb (124.3 kg)   05/26/17 262 lb (118.8 kg)   05/12/17 269 lb (122 kg)              We Performed the Following     BASIC METABOLIC PANEL     FLU VAC, SPLIT VIRUS IM > 3 YO (QUADRIVALENT) [23413]     HEMOGLOBIN A1C     Pneumococcal vaccine 23 valent PPSV23  (Pneumovax) [44517]          Today's Medication Changes          These changes are accurate as of: 12/11/17  3:02 PM.  If you have any questions, ask your nurse or doctor.               These medicines have changed or have updated prescriptions.        Dose/Directions    simvastatin 40 MG tablet   Commonly known as:  ZOCOR   This may have changed:  See the new instructions.   Used for:  Hyperlipidemia LDL goal <130   Changed by:  Dimitris Bansal MD        Dose:  40 mg   Take 1 tablet (40 mg) by mouth At Bedtime   Quantity:  90 tablet   Refills:  3       valsartan-hydrochlorothiazide 160-25 MG per tablet   Commonly known as:  DIOVAN-HCT   This may have changed:  See the new instructions.   Used for:  HTN, goal below 140/90   Changed by:  Dimitris Bansal MD        Dose:  1 tablet   Take 1 tablet by mouth daily   Quantity:  90 tablet   Refills:  3            Where to get your medicines      These medications were sent to [facility] - [address]   [address] [address]     Phone:  [phone]     simvastatin 40 MG tablet    valsartan-hydrochlorothiazide 160-25 MG per tablet                Primary Care Provider Office Phone # Fax #    Shahrzad " Flor Fajardo, APRN [phone]       [address] [facility] [address]        Equal Access to Services     SANDRA WARD : Hadmacey aad ku hadkatia Soana, wakellyda luqasif, qadonta kaalmada adelaida, ny neff laDorismikey castle. So [facility] [phone].    ATENCIÓN: Si habla español, tiene a soto disposición servicios gratuitos de asistencia lingüística. Llame al [phone].    We comply with applicable federal civil rights laws and [place] laws. We do not discriminate on the basis of race, color, national origin, age, disability, sex, sexual orientation, or gender identity.            Thank you!     Thank you for choosing [facility]  for your care. Our goal is always to provide you with excellent care. Hearing back from our patients is one way we can continue to improve our services. Please take a few minutes to complete the written survey that you may receive in the mail after your visit with us. Thank you!             Your Updated Medication List - Protect others around you: Learn how to safely use, store and throw away your medicines at www.disposemymeds.org.          This list is accurate as of: 12/11/17  3:02 PM.  Always use your most recent med list.                   Brand Name Dispense Instructions for use Diagnosis    albuterol 108 (90 BASE) MCG/ACT Inhaler    PROAIR HFA/PROVENTIL HFA/VENTOLIN HFA    3 Inhaler    Inhale 2 puffs into the lungs every 4 hours as needed    SOB (shortness of breath)       busPIRone 2.5 mg half-tab    BUSPAR     Take 5 mg by mouth 3 times daily        DULOXETINE HCL PO           order for DME     1 Units    Equipment being ordered: compression stockings    Acute idiopathic gout of right foot       simvastatin 40 MG tablet    ZOCOR    90 tablet    Take 1 tablet (40 mg) by mouth At Bedtime    Hyperlipidemia LDL goal <130       valsartan-hydrochlorothiazide 160-25 MG per tablet    DIOVAN-HCT    90 tablet    Take 1 tablet by mouth daily     HTN, goal below 140/90

## 2018-01-09 DIAGNOSIS — J63.2: Primary | ICD-10-CM

## 2018-01-09 DIAGNOSIS — Z51.81 ENCOUNTER FOR THERAPEUTIC DRUG MONITORING: ICD-10-CM

## 2018-03-14 DIAGNOSIS — J63.2: ICD-10-CM

## 2018-03-14 DIAGNOSIS — Z51.81 ENCOUNTER FOR THERAPEUTIC DRUG MONITORING: ICD-10-CM

## 2018-03-14 LAB
ALBUMIN SERPL-MCNC: 3.8 G/DL (ref 3.4–5)
ALP SERPL-CCNC: 88 U/L (ref 40–150)
ALT SERPL W P-5'-P-CCNC: 24 U/L (ref 0–70)
AST SERPL W P-5'-P-CCNC: 13 U/L (ref 0–45)
BASOPHILS # BLD AUTO: 0 10E9/L (ref 0–0.2)
BASOPHILS NFR BLD AUTO: 0.6 %
BILIRUB DIRECT SERPL-MCNC: <0.1 MG/DL (ref 0–0.2)
BILIRUB SERPL-MCNC: 0.4 MG/DL (ref 0.2–1.3)
DIFFERENTIAL METHOD BLD: NORMAL
EOSINOPHIL # BLD AUTO: 0.2 10E9/L (ref 0–0.7)
EOSINOPHIL NFR BLD AUTO: 3.6 %
ERYTHROCYTE [DISTWIDTH] IN BLOOD BY AUTOMATED COUNT: 14 % (ref 10–15)
HCT VFR BLD AUTO: 40.8 % (ref 40–53)
HGB BLD-MCNC: 13.7 G/DL (ref 13.3–17.7)
LYMPHOCYTES # BLD AUTO: 0.8 10E9/L (ref 0.8–5.3)
LYMPHOCYTES NFR BLD AUTO: 11.5 %
MCH RBC QN AUTO: 30.9 PG (ref 26.5–33)
MCHC RBC AUTO-ENTMCNC: 33.6 G/DL (ref 31.5–36.5)
MCV RBC AUTO: 92 FL (ref 78–100)
MONOCYTES # BLD AUTO: 0.7 10E9/L (ref 0–1.3)
MONOCYTES NFR BLD AUTO: 10 %
NEUTROPHILS # BLD AUTO: 4.9 10E9/L (ref 1.6–8.3)
NEUTROPHILS NFR BLD AUTO: 74.3 %
PLATELET # BLD AUTO: 177 10E9/L (ref 150–450)
PROT SERPL-MCNC: 7.4 G/DL (ref 6.8–8.8)
RBC # BLD AUTO: 4.44 10E12/L (ref 4.4–5.9)
WBC # BLD AUTO: 6.6 10E9/L (ref 4–11)

## 2018-03-14 PROCEDURE — 36415 COLL VENOUS BLD VENIPUNCTURE: CPT | Performed by: INTERNAL MEDICINE

## 2018-03-14 PROCEDURE — 85025 COMPLETE CBC W/AUTO DIFF WBC: CPT | Performed by: INTERNAL MEDICINE

## 2018-03-14 PROCEDURE — 80076 HEPATIC FUNCTION PANEL: CPT | Performed by: INTERNAL MEDICINE

## 2018-08-20 DIAGNOSIS — I10 HTN, GOAL BELOW 140/90: Primary | ICD-10-CM

## 2018-08-20 RX ORDER — LOSARTAN POTASSIUM AND HYDROCHLOROTHIAZIDE 12.5; 5 MG/1; MG/1
1 TABLET ORAL DAILY
Qty: 90 TABLET | Refills: 1 | Status: SHIPPED | OUTPATIENT
Start: 2018-08-20 | End: 2019-02-25

## 2018-08-20 NOTE — TELEPHONE ENCOUNTER
Sure, I will send a prescription for losartan/HCTZ, recheck BP with nurse only in 2 weeks please.    Dimitris Bansal MD  [facility]  [phone]

## 2018-08-20 NOTE — TELEPHONE ENCOUNTER
Pt informed. He is now living closer to [facility], agrees to NO BP check there. Advised to call ahead for appointment - most [facility] require appointments for nurse visits.   Carlos Tate RN

## 2018-08-20 NOTE — TELEPHONE ENCOUNTER
Dr. Bansal- patient calling to get replacement bp med due to Valsartan recall.  DC'd from med list.  Please advise.  He will check with pharmacy in a day or so.  Elizabeth Flores RN    12/11/18  ASSESSMENT/PLAN:      1. Need for prophylactic vaccination and inoculation against influenza     - FLU VAC, SPLIT VIRUS IM > 3 YO (QUADRIVALENT) [33326]     2. Berylliosis (H)  Recommended by [facility] specialist to vaccinate with   - Pneumococcal vaccine 23 valent PPSV23  (Pneumovax) [63088]     3. HTN, goal below 140/90  Controlled, continue on   - BASIC METABOLIC PANEL  - HEMOGLOBIN A1C  - valsartan-hydrochlorothiazide (DIOVAN-HCT) 160-25 MG per tablet; Take 1 tablet by mouth daily  Dispense: 90 tablet; Refill: 3     4. Hyperlipidemia LDL goal <130  Today I counseled the patient about diet, regular exercise and weight loss planning.     - simvastatin (ZOCOR) 40 MG tablet; Take 1 tablet (40 mg) by mouth At Bedtime  Dispense: 90 tablet; Refill: 3     FUTURE APPOINTMENTS:       - Follow-up visit in 1 year.     Dimitris Bansal MD  [facility]

## 2018-08-21 RX ORDER — LOSARTAN POTASSIUM AND HYDROCHLOROTHIAZIDE 12.5; 5 MG/1; MG/1
1 TABLET ORAL DAILY
Qty: 90 TABLET | Refills: 0 | Status: CANCELLED | OUTPATIENT
Start: 2018-08-21

## 2018-08-21 NOTE — TELEPHONE ENCOUNTER
Rosalino's pharmacist calls back (Diana POTTER), wants to confirm that [facility] wanted BP med decrease to replace Valsartan 160/25, equivalent dose is hyzar 100/25, wanted to confirm correct, also AA confirm wanted 6 months if needs to establish care and needs annual visit in December, routed to [facility], resend, inform pharmacist either way of plan    BP Readings from Last 2 Encounters:   12/11/17 139/87   05/26/17 124/81     Lab Results   Component Value Date    CR 1.18 12/11/2017     Lab Results   Component Value Date    POTASSIUM 3.7 12/11/2017          [facility] - [facility] [address] Jarquin RN, BSN  Message handled by Nurse Triage.

## 2018-08-21 NOTE — TELEPHONE ENCOUNTER
No I recommend 50/12.5, then incresae the dose if BP is not controlled.  That's why I recommended 2 weeks follow up for BP recheck.  Dimitris Bansal MD  [facility]  [phone]  r

## 2018-08-21 NOTE — TELEPHONE ENCOUNTER
Nadeem pharmacist at [facility]. We reviewed Florentino agreed to bp check in two week, we trust he will follow through.    Carlos Tate RN

## 2018-09-13 ENCOUNTER — ALLIED HEALTH/NURSE VISIT (OUTPATIENT)
Dept: FAMILY MEDICINE | Facility: OTHER | Age: 53
End: 2018-09-13
Payer: COMMERCIAL

## 2018-09-13 VITALS — HEART RATE: 84 BPM | DIASTOLIC BLOOD PRESSURE: 80 MMHG | SYSTOLIC BLOOD PRESSURE: 132 MMHG

## 2018-09-13 DIAGNOSIS — Z01.30 BP CHECK: Primary | ICD-10-CM

## 2018-09-13 PROCEDURE — 99207 ZZC NO CHARGE NURSE ONLY: CPT

## 2018-09-13 NOTE — MR AVS SNAPSHOT
After Visit Summary   9/13/2018    Florentino Gallegos    MRN: [id number]           Patient Information     Date Of Birth          1965        Visit Information        Provider Department      9/13/2018 10:00 AM ALIYA BENÍTEZ MA [facility]        Today's Diagnoses     BP check    -  1       Follow-ups after your visit        Who to contact     If you have questions or need follow up information about today's clinic visit or your schedule please contact [facility] directly at [phone].  Normal or non-critical lab and imaging results will be communicated to you by MyChart, letter or phone within 4 business days after the clinic has received the results. If you do not hear from us within 7 days, please contact the clinic through MyChart or phone. If you have a critical or abnormal lab result, we will notify you by phone as soon as possible.  Submit refill requests through [employer] or call your pharmacy and they will forward the refill request to us. Please allow 3 business days for your refill to be completed.          Additional Information About Your Visit        Care EveryWhere ID     This is your Care EveryWhere ID. This could be used by other organizations to access your [facility] medical records  [id number]        Your Vitals Were     Pulse                   84            Blood Pressure from Last 3 Encounters:   09/13/18 132/80   12/11/17 139/87   05/26/17 124/81    Weight from Last 3 Encounters:   12/11/17 274 lb (124.3 kg)   05/26/17 262 lb (118.8 kg)   05/12/17 269 lb (122 kg)              Today, you had the following     No orders found for display       Primary Care Provider Office Phone # Fax #    EVAN Lopez [phone]       [address] [facility] [address]        Equal Access to Services     SANDRA WARD : Grace Galvez, rima prabhakar, qany lewis. So [facility]  [phone].    ATENCIÓN: Si bronson zhong, tiene a soto disposición servicios gratuitos de asistencia lingüística. Ayan mo [phone].    We comply with applicable federal civil rights laws and [place] laws. We do not discriminate on the basis of race, color, national origin, age, disability, sex, sexual orientation, or gender identity.            Thank you!     Thank you for choosing [facility]  for your care. Our goal is always to provide you with excellent care. Hearing back from our patients is one way we can continue to improve our services. Please take a few minutes to complete the written survey that you may receive in the mail after your visit with us. Thank you!             Your Updated Medication List - Protect others around you: Learn how to safely use, store and throw away your medicines at www.disposemymeds.org.          This list is accurate as of 9/13/18 10:53 AM.  Always use your most recent med list.                   Brand Name Dispense Instructions for use Diagnosis    albuterol 108 (90 Base) MCG/ACT inhaler    PROAIR HFA/PROVENTIL HFA/VENTOLIN HFA    3 Inhaler    Inhale 2 puffs into the lungs every 4 hours as needed    SOB (shortness of breath)       busPIRone 2.5 mg half-tab    BUSPAR     Take 5 mg by mouth 3 times daily        DULOXETINE HCL PO           losartan-hydrochlorothiazide 50-12.5 MG per tablet    HYZAAR    90 tablet    Take 1 tablet by mouth daily    HTN, goal below 140/90       order for DME     1 Units    Equipment being ordered: compression stockings    Acute idiopathic gout of right foot       simvastatin 40 MG tablet    ZOCOR    90 tablet    Take 1 tablet (40 mg) by mouth At Bedtime    Hyperlipidemia LDL goal <130

## 2018-09-13 NOTE — NURSING NOTE
Florentino Gallegos is a 53 year old patient who comes in today for a Blood Pressure check.  Initial BP:  /80 (BP Location: Left arm, Patient Position: Sitting, Cuff Size: Adult Large)  Pulse 84       Disposition: results routed to provider    Zahraa Vasquez MA     9/13/2018

## 2018-10-23 ENCOUNTER — OFFICE VISIT (OUTPATIENT)
Dept: FAMILY MEDICINE | Facility: CLINIC | Age: 53
End: 2018-10-23
Payer: COMMERCIAL

## 2018-10-23 VITALS
DIASTOLIC BLOOD PRESSURE: 74 MMHG | RESPIRATION RATE: 20 BRPM | WEIGHT: 287 LBS | BODY MASS INDEX: 42.38 KG/M2 | TEMPERATURE: 98.3 F | HEART RATE: 89 BPM | SYSTOLIC BLOOD PRESSURE: 128 MMHG

## 2018-10-23 DIAGNOSIS — E66.01 MORBID OBESITY DUE TO EXCESS CALORIES (H): ICD-10-CM

## 2018-10-23 DIAGNOSIS — J63.2: ICD-10-CM

## 2018-10-23 DIAGNOSIS — R60.0 LEG EDEMA: Primary | ICD-10-CM

## 2018-10-23 DIAGNOSIS — M79.672 LEFT FOOT PAIN: ICD-10-CM

## 2018-10-23 LAB
ERYTHROCYTE [DISTWIDTH] IN BLOOD BY AUTOMATED COUNT: 13.8 % (ref 10–15)
HBA1C MFR BLD: 5.2 % (ref 0–5.6)
HCT VFR BLD AUTO: 42 % (ref 40–53)
HGB BLD-MCNC: 13.8 G/DL (ref 13.3–17.7)
MCH RBC QN AUTO: 30.8 PG (ref 26.5–33)
MCHC RBC AUTO-ENTMCNC: 32.9 G/DL (ref 31.5–36.5)
MCV RBC AUTO: 94 FL (ref 78–100)
PLATELET # BLD AUTO: 220 10E9/L (ref 150–450)
RBC # BLD AUTO: 4.48 10E12/L (ref 4.4–5.9)
WBC # BLD AUTO: 8.1 10E9/L (ref 4–11)

## 2018-10-23 PROCEDURE — 80048 BASIC METABOLIC PNL TOTAL CA: CPT | Performed by: FAMILY MEDICINE

## 2018-10-23 PROCEDURE — 36415 COLL VENOUS BLD VENIPUNCTURE: CPT | Performed by: FAMILY MEDICINE

## 2018-10-23 PROCEDURE — 99214 OFFICE O/P EST MOD 30 MIN: CPT | Performed by: FAMILY MEDICINE

## 2018-10-23 PROCEDURE — 83036 HEMOGLOBIN GLYCOSYLATED A1C: CPT | Performed by: FAMILY MEDICINE

## 2018-10-23 PROCEDURE — 85027 COMPLETE CBC AUTOMATED: CPT | Performed by: FAMILY MEDICINE

## 2018-10-23 NOTE — MR AVS SNAPSHOT
After Visit Summary   10/23/2018    Florentino Gallegos    MRN: [id number]           Patient Information     Date Of Birth          1965        Visit Information        Provider Department      10/23/2018 10:00 AM Dimitris Bansal MD [facility]        Today's Diagnoses     Leg edema    -  1    Morbid obesity due to excess calories (H)        Berylliosis (H)        Left foot pain           Follow-ups after your visit        Additional Services     PHYSICAL THERAPY REFERRAL       Please be aware that coverage of these services is subject to the terms and limitations of your health insurance plan.  Call member services at your health plan with any benefit or coverage questions.                  Follow-up notes from your care team     Return in about 4 weeks (around 11/20/2018).      Future tests that were ordered for you today     Open Future Orders        Priority Expected Expires Ordered    PHYSICAL THERAPY REFERRAL Routine  10/23/2019 10/23/2018            Who to contact     If you have questions or need follow up information about today's clinic visit or your schedule please contact [facility] directly at [phone].  Normal or non-critical lab and imaging results will be communicated to you by MyChart, letter or phone within 4 business days after the clinic has received the results. If you do not hear from us within 7 days, please contact the clinic through MyChart or phone. If you have a critical or abnormal lab result, we will notify you by phone as soon as possible.  Submit refill requests through [employer] or call your pharmacy and they will forward the refill request to us. Please allow 3 business days for your refill to be completed.          Additional Information About Your Visit        Care EveryWhere ID     This is your Care EveryWhere ID. This could be used by other organizations to access your [facility] medical records  [id number]        Your Vitals  Were     Pulse Temperature Respirations BMI (Body Mass Index)          89 98.3  F (36.8  C) (Oral) 20 42.38 kg/m2         Blood Pressure from Last 3 Encounters:   10/23/18 128/74   09/13/18 132/80   12/11/17 139/87    Weight from Last 3 Encounters:   10/23/18 287 lb (130.2 kg)   12/11/17 274 lb (124.3 kg)   05/26/17 262 lb (118.8 kg)              We Performed the Following     Basic metabolic panel     CBC with platelets     Hemoglobin A1c        Primary Care Provider Office Phone # Fax #    EVAN Lopez [phone]       [address] [facility] [address]        Equal Access to Services     SANDRA WARD : Hadii brook limo Soana, waaxda luqadaha, qaybta kaalmada adeegyada, ny angelo . So [facility] [phone].    ATENCIÓN: Si habla español, tiene a soto disposición servicios gratuitos de asistencia lingüística. Llame al [phone].    We comply with applicable federal civil rights laws and [place] laws. We do not discriminate on the basis of race, color, national origin, age, disability, sex, sexual orientation, or gender identity.            Thank you!     Thank you for choosing [facility]  for your care. Our goal is always to provide you with excellent care. Hearing back from our patients is one way we can continue to improve our services. Please take a few minutes to complete the written survey that you may receive in the mail after your visit with us. Thank you!             Your Updated Medication List - Protect others around you: Learn how to safely use, store and throw away your medicines at www.disposemymeds.org.          This list is accurate as of 10/23/18 10:30 AM.  Always use your most recent med list.                   Brand Name Dispense Instructions for use Diagnosis    albuterol 108 (90 Base) MCG/ACT inhaler    PROAIR HFA/PROVENTIL HFA/VENTOLIN HFA    3 Inhaler    Inhale 2 puffs into the lungs every 4 hours as needed     SOB (shortness of breath)       DULOXETINE HCL PO           fluticasone-vilanterol 100-25 MCG/INH inhaler    BREO ELLIPTA     Inhale 1 puff into the lungs        FOLIC ACID PO      Take 6 mg by mouth daily        losartan-hydrochlorothiazide 50-12.5 MG per tablet    HYZAAR    90 tablet    Take 1 tablet by mouth daily    HTN, goal below 140/90       order for DME     1 Units    Equipment being ordered: compression stockings    Acute idiopathic gout of right foot       simvastatin 40 MG tablet    ZOCOR    90 tablet    Take 1 tablet (40 mg) by mouth At Bedtime    Hyperlipidemia LDL goal <130

## 2018-10-23 NOTE — LETTER
October 24, 2018      Florentino Gallegos  [address]  [facility] [address]        Dear ,    We are writing to inform you of your test results.      Letter :   Alex Colindresby, your blood chemistry is within normal, and your blood sugar is normal.   Your blood count is normal.          Resulted Orders   Basic metabolic panel   Result Value Ref Range    Sodium 141 133 - 144 mmol/L    Potassium 3.5 3.4 - 5.3 mmol/L    Chloride 107 94 - 109 mmol/L    Carbon Dioxide 26 20 - 32 mmol/L    Anion Gap 8 3 - 14 mmol/L    Glucose 116 (H) 70 - 99 mg/dL    Urea Nitrogen 17 7 - 30 mg/dL    Creatinine 1.21 0.66 - 1.25 mg/dL    GFR Estimate 62 >60 mL/min/1.7m2      Comment:      Non  GFR Calc    GFR Estimate If Black 75 >60 mL/min/1.7m2      Comment:       GFR Calc    Calcium 9.5 8.5 - 10.1 mg/dL   Hemoglobin A1c   Result Value Ref Range    Hemoglobin A1C 5.2 0 - 5.6 %      Comment:      Normal <5.7% Prediabetes 5.7-6.4%  Diabetes 6.5% or higher - adopted from ADA   consensus guidelines.     CBC with platelets   Result Value Ref Range    WBC 8.1 4.0 - 11.0 10e9/L    RBC Count 4.48 4.4 - 5.9 10e12/L    Hemoglobin 13.8 13.3 - 17.7 g/dL    Hematocrit 42.0 40.0 - 53.0 %    MCV 94 78 - 100 fl    MCH 30.8 26.5 - 33.0 pg    MCHC 32.9 31.5 - 36.5 g/dL    RDW 13.8 10.0 - 15.0 %    Platelet Count 220 150 - 450 10e9/L       If you have any questions or concerns, please call the clinic at the number listed above.       Sincerely,        Dimitris Bansal MD

## 2018-10-24 LAB
ANION GAP SERPL CALCULATED.3IONS-SCNC: 8 MMOL/L (ref 3–14)
BUN SERPL-MCNC: 17 MG/DL (ref 7–30)
CALCIUM SERPL-MCNC: 9.5 MG/DL (ref 8.5–10.1)
CHLORIDE SERPL-SCNC: 107 MMOL/L (ref 94–109)
CO2 SERPL-SCNC: 26 MMOL/L (ref 20–32)
CREAT SERPL-MCNC: 1.21 MG/DL (ref 0.66–1.25)
GFR SERPL CREATININE-BSD FRML MDRD: 62 ML/MIN/1.7M2
GLUCOSE SERPL-MCNC: 116 MG/DL (ref 70–99)
POTASSIUM SERPL-SCNC: 3.5 MMOL/L (ref 3.4–5.3)
SODIUM SERPL-SCNC: 141 MMOL/L (ref 133–144)

## 2018-12-25 DIAGNOSIS — E78.5 HYPERLIPIDEMIA LDL GOAL <130: ICD-10-CM

## 2018-12-26 NOTE — TELEPHONE ENCOUNTER
"Requested Prescriptions   Pending Prescriptions Disp Refills     simvastatin (ZOCOR) 40 MG tablet [Pharmacy Med Name: SIMVASTATIN 40MG TABS]    Last Written Prescription Date: 12/11/17   Last Fill Quantity: 90 Tablet,  # refills: 3   Last office visit: 10/23/2018 with prescribing provider:    Rolf    Future Office Visit:     90 tablet 3     Sig: TAKE ONE TABLET BY MOUTH AT BEDTIME    Statins Protocol Failed - 12/25/2018  8:33 AM       Failed - LDL on file in past 12 months    Recent Labs   Lab Test 05/12/17  1054   *            Passed - No abnormal creatine kinase in past 12 months    No lab results found.            Passed - Recent (12 mo) or future (30 days) visit within the authorizing provider's specialty    Patient had office visit in the last 12 months or has a visit in the next 30 days with authorizing provider or within the authorizing provider's specialty.  See \"Patient Info\" tab in inbasket, or \"Choose Columns\" in Meds & Orders section of the refill encounter.             Passed - Patient is age 18 or older          "

## 2018-12-27 RX ORDER — SIMVASTATIN 40 MG
TABLET ORAL
Qty: 90 TABLET | Refills: 3 | Status: SHIPPED | OUTPATIENT
Start: 2018-12-27 | End: 2019-10-31

## 2018-12-27 NOTE — TELEPHONE ENCOUNTER
Routing refill request to provider for review/approval because:  Labs not current:  FLP    Do you want a lab only or a full physical?    Roro Cat RN -- [facility] [employer]

## 2019-02-15 DIAGNOSIS — E78.5 HYPERLIPIDEMIA LDL GOAL <130: ICD-10-CM

## 2019-02-15 LAB
CHOLEST SERPL-MCNC: 174 MG/DL
HDLC SERPL-MCNC: 50 MG/DL
LDLC SERPL CALC-MCNC: 102 MG/DL
NONHDLC SERPL-MCNC: 124 MG/DL
TRIGL SERPL-MCNC: 110 MG/DL

## 2019-02-15 PROCEDURE — 36415 COLL VENOUS BLD VENIPUNCTURE: CPT | Performed by: FAMILY MEDICINE

## 2019-02-15 PROCEDURE — 80061 LIPID PANEL: CPT | Performed by: FAMILY MEDICINE

## 2019-02-25 DIAGNOSIS — I10 HTN, GOAL BELOW 140/90: ICD-10-CM

## 2019-02-25 NOTE — TELEPHONE ENCOUNTER
"Requested Prescriptions   Pending Prescriptions Disp Refills     losartan-hydrochlorothiazide (HYZAAR) 50-12.5 MG tablet [Pharmacy Med Name: LOSARTAN-HCTZ 50-12.5 TABS]  Last Written Prescription Date:  8/20/2018  Last Fill Quantity: 90 tablet,  # refills: 1   Last office visit: 10/23/2018 with prescribing provider:  Rolf   Future Office Visit:     90 tablet 1     Sig: TAKE ONE TABLET BY MOUTH ONCE DAILY    Angiotensin-II Receptors Passed - 2/25/2019  9:42 AM       Passed - Blood pressure under 140/90 in past 12 months    BP Readings from Last 3 Encounters:   10/23/18 128/74   09/13/18 132/80   12/11/17 139/87                Passed - Recent (12 mo) or future (30 days) visit within the authorizing provider's specialty    Patient had office visit in the last 12 months or has a visit in the next 30 days with authorizing provider or within the authorizing provider's specialty.  See \"Patient Info\" tab in inbasket, or \"Choose Columns\" in Meds & Orders section of the refill encounter.             Passed - Medication is active on med list       Passed - Patient is age 18 or older       Passed - Normal serum creatinine on file in past 12 months    Recent Labs   Lab Test 10/23/18  1038   CR 1.21            Passed - Normal serum potassium on file in past 12 months    Recent Labs   Lab Test 10/23/18  1038   POTASSIUM 3.5                      "

## 2019-02-27 RX ORDER — LOSARTAN POTASSIUM AND HYDROCHLOROTHIAZIDE 12.5; 5 MG/1; MG/1
TABLET ORAL
Qty: 90 TABLET | Refills: 0 | Status: SHIPPED | OUTPATIENT
Start: 2019-02-27 | End: 2019-06-15

## 2019-06-15 DIAGNOSIS — I10 HTN, GOAL BELOW 140/90: ICD-10-CM

## 2019-06-17 RX ORDER — LOSARTAN POTASSIUM AND HYDROCHLOROTHIAZIDE 12.5; 5 MG/1; MG/1
TABLET ORAL
Qty: 90 TABLET | Refills: 0 | Status: SHIPPED | OUTPATIENT
Start: 2019-06-17 | End: 2019-09-29

## 2019-06-17 NOTE — TELEPHONE ENCOUNTER
Prescription approved per [facility] Refill Protocol.    Kristine Ames RN

## 2019-06-17 NOTE — TELEPHONE ENCOUNTER
"Requested Prescriptions   Pending Prescriptions Disp Refills     losartan-hydrochlorothiazide (HYZAAR) 50-12.5 MG tablet [Pharmacy Med Name: LOSARTAN POTASSIUM-HCT 50-12.5 TABS]  Last Written Prescription Date:  2/27/2019  Last Fill Quantity: 90 tablet,  # refills: 0   Last office visit: 10/23/2018 with prescribing provider:  Rolf   Future Office Visit:     90 tablet 0     Sig: TAKE ONE TABLET BY MOUTH ONCE DAILY -NEEDS APPOINTMENT-       Angiotensin-II Receptors Passed - 6/15/2019 10:41 AM        Passed - Blood pressure under 140/90 in past 12 months     BP Readings from Last 3 Encounters:   10/23/18 128/74   09/13/18 132/80   12/11/17 139/87                 Passed - Recent (12 mo) or future (30 days) visit within the authorizing provider's specialty     Patient had office visit in the last 12 months or has a visit in the next 30 days with authorizing provider or within the authorizing provider's specialty.  See \"Patient Info\" tab in inbasket, or \"Choose Columns\" in Meds & Orders section of the refill encounter.              Passed - Medication is active on med list        Passed - Patient is age 18 or older        Passed - Normal serum creatinine on file in past 12 months     Recent Labs   Lab Test 10/23/18  1038   CR 1.21             Passed - Normal serum potassium on file in past 12 months     Recent Labs   Lab Test 10/23/18  1038   POTASSIUM 3.5                      "

## 2019-09-29 DIAGNOSIS — I10 HTN, GOAL BELOW 140/90: ICD-10-CM

## 2019-09-29 NOTE — LETTER
October 1, 2019      Florentino Gallegos  [address]  [place] [address]        Dear Florentino,       We recently received a call from your pharmacy requesting a refill of your medication (hyzaar).    A review of your chart indicates that an appointment is required.  Please contact our office at [phone] to schedule your ANNUAL doctor's appointment with Dr Bansal or whomever your PCP is (we have Shahrzad Fajardo listed?).     We have authorized one refill of your medication to allow time for you to schedule your appointment.    Taking care of your health is important to us and ongoing visits with your provider are vital to your care.  We look forward to seeing you in the near future.        Sincerely,        Dimitris Bansal MD/Daya BIANCHI

## 2019-09-30 NOTE — TELEPHONE ENCOUNTER
"Requested Prescriptions   Pending Prescriptions Disp Refills     losartan-hydrochlorothiazide (HYZAAR) 50-12.5 MG tablet [Pharmacy Med Name: LOSARTAN POTASSIUM-HCT 50-12.5 TABS]  Last Written Prescription Date:  06/17/2019  Last Fill Quantity: 90 tablet,  # refills: 0   Last Office Visit: 10/23/2018   Future Office Visit:      90 tablet 0     Sig: TAKE ONE TABLET BY MOUTH ONCE DAILY -NEEDS APPOINTMENT-       Angiotensin-II Receptors Passed - 9/29/2019  5:13 PM        Passed - Last blood pressure under 140/90 in past 12 months     BP Readings from Last 3 Encounters:   10/23/18 128/74   09/13/18 132/80   12/11/17 139/87           Passed - Recent (12 mo) or future (30 days) visit within the authorizing provider's specialty     Patient has had an office visit with the authorizing provider or a provider within the authorizing providers department within the previous 12 mos or has a future within next 30 days. See \"Patient Info\" tab in inbasket, or \"Choose Columns\" in Meds & Orders section of the refill encounter.            Passed - Medication is active on med list        Passed - Patient is age 18 or older        Passed - Normal serum creatinine on file in past 12 months     Recent Labs   Lab Test 10/23/18  1038   CR 1.21           Passed - Normal serum potassium on file in past 12 months     Recent Labs   Lab Test 10/23/18  1038   POTASSIUM 3.5                "

## 2019-10-01 RX ORDER — LOSARTAN POTASSIUM AND HYDROCHLOROTHIAZIDE 12.5; 5 MG/1; MG/1
TABLET ORAL
Qty: 30 TABLET | Refills: 0 | Status: SHIPPED | OUTPATIENT
Start: 2019-10-01 | End: 2019-10-31

## 2019-10-01 NOTE — TELEPHONE ENCOUNTER
Pt needs appointment, sent short term, letter sent, confirm PCP  Prescription approved per [facility] Refill Protocol.  Breanna Robison RN, BSN

## 2019-10-31 ENCOUNTER — OFFICE VISIT (OUTPATIENT)
Dept: FAMILY MEDICINE | Facility: OTHER | Age: 54
End: 2019-10-31
Payer: COMMERCIAL

## 2019-10-31 VITALS
WEIGHT: 276.6 LBS | TEMPERATURE: 97.5 F | HEART RATE: 76 BPM | SYSTOLIC BLOOD PRESSURE: 130 MMHG | DIASTOLIC BLOOD PRESSURE: 80 MMHG | BODY MASS INDEX: 40.97 KG/M2 | OXYGEN SATURATION: 98 % | HEIGHT: 69 IN | RESPIRATION RATE: 20 BRPM

## 2019-10-31 DIAGNOSIS — Z23 NEED FOR PROPHYLACTIC VACCINATION AND INOCULATION AGAINST INFLUENZA: ICD-10-CM

## 2019-10-31 DIAGNOSIS — E78.5 HYPERLIPIDEMIA LDL GOAL <130: ICD-10-CM

## 2019-10-31 DIAGNOSIS — I10 HTN, GOAL BELOW 140/90: Primary | ICD-10-CM

## 2019-10-31 DIAGNOSIS — Z23 NEED FOR VACCINATION: ICD-10-CM

## 2019-10-31 DIAGNOSIS — J63.2: ICD-10-CM

## 2019-10-31 LAB
ANION GAP SERPL CALCULATED.3IONS-SCNC: 6 MMOL/L (ref 3–14)
BUN SERPL-MCNC: 18 MG/DL (ref 7–30)
CALCIUM SERPL-MCNC: 9.2 MG/DL (ref 8.5–10.1)
CHLORIDE SERPL-SCNC: 108 MMOL/L (ref 94–109)
CHOLEST SERPL-MCNC: 177 MG/DL
CO2 SERPL-SCNC: 27 MMOL/L (ref 20–32)
CREAT SERPL-MCNC: 1.24 MG/DL (ref 0.66–1.25)
GFR SERPL CREATININE-BSD FRML MDRD: 65 ML/MIN/{1.73_M2}
GLUCOSE SERPL-MCNC: 112 MG/DL (ref 70–99)
HDLC SERPL-MCNC: 60 MG/DL
LDLC SERPL CALC-MCNC: 87 MG/DL
NONHDLC SERPL-MCNC: 117 MG/DL
POTASSIUM SERPL-SCNC: 4 MMOL/L (ref 3.4–5.3)
SODIUM SERPL-SCNC: 141 MMOL/L (ref 133–144)
TRIGL SERPL-MCNC: 151 MG/DL

## 2019-10-31 PROCEDURE — 99214 OFFICE O/P EST MOD 30 MIN: CPT | Mod: 25 | Performed by: NURSE PRACTITIONER

## 2019-10-31 PROCEDURE — 90714 TD VACC NO PRESV 7 YRS+ IM: CPT | Performed by: NURSE PRACTITIONER

## 2019-10-31 PROCEDURE — 90471 IMMUNIZATION ADMIN: CPT | Performed by: NURSE PRACTITIONER

## 2019-10-31 PROCEDURE — 90682 RIV4 VACC RECOMBINANT DNA IM: CPT | Performed by: NURSE PRACTITIONER

## 2019-10-31 PROCEDURE — 36415 COLL VENOUS BLD VENIPUNCTURE: CPT | Performed by: NURSE PRACTITIONER

## 2019-10-31 PROCEDURE — 80061 LIPID PANEL: CPT | Performed by: NURSE PRACTITIONER

## 2019-10-31 PROCEDURE — 90472 IMMUNIZATION ADMIN EACH ADD: CPT | Performed by: NURSE PRACTITIONER

## 2019-10-31 PROCEDURE — 80048 BASIC METABOLIC PNL TOTAL CA: CPT | Performed by: NURSE PRACTITIONER

## 2019-10-31 RX ORDER — LOSARTAN POTASSIUM AND HYDROCHLOROTHIAZIDE 12.5; 5 MG/1; MG/1
TABLET ORAL
Qty: 90 TABLET | Refills: 3 | Status: SHIPPED | OUTPATIENT
Start: 2019-10-31 | End: 2020-12-03

## 2019-10-31 RX ORDER — SIMVASTATIN 40 MG
TABLET ORAL
Qty: 90 TABLET | Refills: 3 | Status: SHIPPED | OUTPATIENT
Start: 2019-10-31 | End: 2020-11-11

## 2019-10-31 ASSESSMENT — MIFFLIN-ST. JEOR: SCORE: 2080.03

## 2019-10-31 NOTE — PROGRESS NOTES
"Subjective     Florentino Gallegos is a 54 year old male who presents to clinic today for the following health issues:    HPI   Hyperlipidemia Follow-Up      Are you having any of the following symptoms? (Select all that apply)  Shortness of breath and Chest pain or pressure    Are you regularly taking any medication or supplement to lower your cholesterol?   Yes- simvastatin    Are you having muscle aches or other side effects that you think could be caused by your cholesterol lowering medication?  Yes- .    Hypertension Follow-up      Do you check your blood pressure regularly outside of the clinic? No     Are you following a low salt diet? No    Are your blood pressures ever more than 140 on the top number (systolic) OR more   than 90 on the bottom number (diastolic), for example 140/90? Yes    Has Berylliosis and has a lung specialist he is following with for that.       Review of Systems   ROS COMP: Constitutional, HEENT, cardiovascular, pulmonary, gi and gu systems are negative, except as otherwise noted.      Objective    /80   Pulse 76   Temp 97.5  F (36.4  C) (Temporal)   Resp 20   Ht 1.753 m (5' 9\")   Wt 125.5 kg (276 lb 9.6 oz)   SpO2 98%   BMI 40.85 kg/m    Body mass index is 40.85 kg/m .  Physical Exam   GENERAL: healthy, alert and no distress, obese   NECK: no adenopathy  RESP: decreased breath sounds throughout  CV: regular rate and rhythm, normal S1 S2, no S3 or S4, no murmur, click or rub  ABDOMEN: soft, nontender, no hepatosplenomegaly, no masses and bowel sounds normal  MS: no gross musculoskeletal defects noted, no edema    Diagnostic Test Results:  Pending         Assessment & Plan     1. HTN, goal below 140/90  Chronic, controlled.  No change in treatment plan.   - losartan-hydrochlorothiazide (HYZAAR) 50-12.5 MG tablet; TAKE ONE TABLET BY MOUTH ONCE DAILY  Dispense: 90 tablet; Refill: 3  - Basic metabolic panel  (Ca, Cl, CO2, Creat, Gluc, K, Na, BUN)    2. Hyperlipidemia LDL goal " <130  Chronic, controlled.  No change in treatment plan.   - simvastatin (ZOCOR) 40 MG tablet; TAKE ONE TABLET BY MOUTH AT BEDTIME  Dispense: 90 tablet; Refill: 3  - Lipid panel reflex to direct LDL Fasting    3. Berylliosis (H)  Following with Pulmonology, stable.     4. Need for vaccination  - TD PRSERV FREE >=7 YRS ADS IM [48420]  - 1st  Administration  [01445]    5. Need for prophylactic vaccination and inoculation against influenza  - INFLUENZA QUAD, RECOMBINANT, P-FREE (RIV4) (FLUBLOCK) [39652]  - Vaccine Administration, Each Additional [57759]       See Patient Instructions    Return in about 1 year (around 10/31/2020) for Physical, Lab Work.    EVAN Lopez [facility]

## 2019-10-31 NOTE — LETTER
"November 5, 2019      Florentino Gallegos  [address]  [place] [address]        Dear ,    We are writing to inform you of your test results.    Your glucose came back elevated, but not to the level of diabetes.  We call this impaired glucose tolerance or \"pre-diabetes\" . This often will lead to diabetes with time, but the good news is you can still reverse this.  I would like you to improve your diet by eating less simple carbohydrates (white breads, pasta, rice, sugars) and less saturated fats.  I would like you to increase your exercise to at least 150 minutes per week of moderately strenuous activity (work up a sweat).  We will recheck this again in 1 year.     Electronically signed by Shahrzad Fajardo CNP.    Resulted Orders   Basic metabolic panel  (Ca, Cl, CO2, Creat, Gluc, K, Na, BUN)   Result Value Ref Range    Sodium 141 133 - 144 mmol/L    Potassium 4.0 3.4 - 5.3 mmol/L    Chloride 108 94 - 109 mmol/L    Carbon Dioxide 27 20 - 32 mmol/L    Anion Gap 6 3 - 14 mmol/L    Glucose 112 (H) 70 - 99 mg/dL    Urea Nitrogen 18 7 - 30 mg/dL    Creatinine 1.24 0.66 - 1.25 mg/dL    GFR Estimate 65 >60 mL/min/[1.73_m2]      Comment:      Non  GFR Calc  Starting 12/18/2018, serum creatinine based estimated GFR (eGFR) will be   calculated using the Chronic Kidney Disease Epidemiology Collaboration   (CKD-EPI) equation.      GFR Estimate If Black 75 >60 mL/min/[1.73_m2]      Comment:       GFR Calc  Starting 12/18/2018, serum creatinine based estimated GFR (eGFR) will be   calculated using the Chronic Kidney Disease Epidemiology Collaboration   (CKD-EPI) equation.      Calcium 9.2 8.5 - 10.1 mg/dL   Lipid panel reflex to direct LDL Fasting   Result Value Ref Range    Cholesterol 177 <200 mg/dL    Triglycerides 151 (H) <150 mg/dL      Comment:      Borderline high:  150-199 mg/dl  High:             200-499 mg/dl  Very high:       >499 mg/dl      HDL Cholesterol 60 >39 mg/dL    LDL " Cholesterol Calculated 87 <100 mg/dL      Comment:      Desirable:       <100 mg/dl    Non HDL Cholesterol 117 <130 mg/dL       If you have any questions or concerns, please call the clinic at the number listed above.       Sincerely,        EVAN Lopez CNP

## 2019-10-31 NOTE — NURSING NOTE
Clinic Administered Medication Documentation    MEDICATION LIST:   Injectable Medication Documentation    Patient was given Td. Prior to medication administration, verified patients identity using patient s name and date of birth. Please see MAR and medication order for additional information. Patient instructed to remain in clinic for 15 minutes and report any adverse reaction to staff immediately .      Was entire vial of medication used? Yes  Vial/Syringe: Syringe  Expiration Date:  2/6/2021  Was this medication supplied by the patient? No     ................Rebel Alan LPN,   October 31, 2019,      11:04 AM,   [facility]

## 2019-10-31 NOTE — PATIENT INSTRUCTIONS
Labs will be done today.   For normal results, you will receive a letter with the results in about 2 weeks.  If anything is abnormal or unexpected, someone from the clinic will call you.

## 2019-12-27 ENCOUNTER — TRANSFERRED RECORDS (OUTPATIENT)
Dept: HEALTH INFORMATION MANAGEMENT | Facility: CLINIC | Age: 54
End: 2019-12-27

## 2019-12-27 ENCOUNTER — MEDICAL CORRESPONDENCE (OUTPATIENT)
Dept: HEALTH INFORMATION MANAGEMENT | Facility: CLINIC | Age: 54
End: 2019-12-27

## 2019-12-31 DIAGNOSIS — J63.2: Primary | ICD-10-CM

## 2019-12-31 DIAGNOSIS — Z51.81 ADMISSION FOR THERAPEUTIC DRUG MONITORING: ICD-10-CM

## 2019-12-31 DIAGNOSIS — I27.20 PULMONARY HYPERTENSION (H): ICD-10-CM

## 2020-01-02 ENCOUNTER — HOSPITAL ENCOUNTER (OUTPATIENT)
Dept: CARDIOLOGY | Facility: CLINIC | Age: 55
Discharge: HOME | End: 2020-01-02
Admitting: INTERNAL MEDICINE
Payer: OTHER MISCELLANEOUS

## 2020-01-02 DIAGNOSIS — I27.20 PULMONARY HYPERTENSION (H): ICD-10-CM

## 2020-01-02 DIAGNOSIS — J63.2: ICD-10-CM

## 2020-01-02 PROCEDURE — 93306 TTE W/DOPPLER COMPLETE: CPT | Mod: 26 | Performed by: INTERNAL MEDICINE

## 2020-01-02 PROCEDURE — 25500064 ZZH RX 255 OP 636: Performed by: INTERNAL MEDICINE

## 2020-01-02 RX ADMIN — HUMAN ALBUMIN MICROSPHERES AND PERFLUTREN 4 ML: 10; .22 INJECTION, SOLUTION INTRAVENOUS at 09:21

## 2020-11-11 DIAGNOSIS — E78.5 HYPERLIPIDEMIA LDL GOAL <130: ICD-10-CM

## 2020-11-11 RX ORDER — SIMVASTATIN 40 MG
TABLET ORAL
Qty: 90 TABLET | Refills: 0 | Status: SHIPPED | OUTPATIENT
Start: 2020-11-11 | End: 2020-12-03

## 2020-11-11 NOTE — TELEPHONE ENCOUNTER
Called patient, gave message below. Patient will call back to schedule an appointment with his provider.

## 2020-11-11 NOTE — TELEPHONE ENCOUNTER
Medication is being filled for 1 time refill only due to:  Patient needs to be seen because it has been more than one year since last visit.     Routing to schedulers to set up appointment for patient.  Patient has been given #90 to get to appointment per triage protocol.    Valerie Knowles, DANIELN, RN  [facility]

## 2020-12-01 DIAGNOSIS — I10 HTN, GOAL BELOW 140/90: Primary | ICD-10-CM

## 2020-12-02 DIAGNOSIS — E78.5 HYPERLIPIDEMIA LDL GOAL <130: ICD-10-CM

## 2020-12-02 DIAGNOSIS — I10 HTN, GOAL BELOW 140/90: Primary | ICD-10-CM

## 2020-12-02 NOTE — TELEPHONE ENCOUNTER
Routing refill request to provider for review/approval because:  Drug not active on patient's medication list, listed as combo, pharmacy requesting separate RXs  Labs not current:  BP, Creatinine, Potassium, Sodium  Patient needs to be seen because it has been more than 1 year since last office visit.    Valerie Knowles, BSN, RN  [facility]

## 2020-12-03 DIAGNOSIS — E78.5 HYPERLIPIDEMIA LDL GOAL <130: ICD-10-CM

## 2020-12-03 DIAGNOSIS — I10 HTN, GOAL BELOW 140/90: ICD-10-CM

## 2020-12-03 RX ORDER — SIMVASTATIN 40 MG
40 TABLET ORAL AT BEDTIME
Qty: 90 TABLET | Refills: 0 | Status: SHIPPED | OUTPATIENT
Start: 2020-12-03 | End: 2021-03-30

## 2020-12-03 RX ORDER — LOSARTAN POTASSIUM 50 MG/1
TABLET ORAL
Qty: 90 TABLET | Refills: 0 | OUTPATIENT
Start: 2020-12-03

## 2020-12-03 RX ORDER — LOSARTAN POTASSIUM AND HYDROCHLOROTHIAZIDE 12.5; 5 MG/1; MG/1
TABLET ORAL
Qty: 90 TABLET | Refills: 0 | Status: SHIPPED | OUTPATIENT
Start: 2020-12-03 | End: 2021-03-22

## 2020-12-03 RX ORDER — LOSARTAN POTASSIUM 50 MG/1
TABLET ORAL
Qty: 90 TABLET | Refills: 0 | Status: SHIPPED | OUTPATIENT
Start: 2020-12-03 | End: 2021-03-30

## 2020-12-03 RX ORDER — SIMVASTATIN 40 MG
TABLET ORAL
Qty: 90 TABLET | Refills: 0 | Status: SHIPPED | OUTPATIENT
Start: 2020-12-03 | End: 2021-03-30

## 2020-12-03 RX ORDER — HYDROCHLOROTHIAZIDE 12.5 MG/1
TABLET ORAL
Qty: 90 TABLET | Refills: 0 | Status: SHIPPED | OUTPATIENT
Start: 2020-12-03 | End: 2021-03-30

## 2020-12-03 RX ORDER — HYDROCHLOROTHIAZIDE 12.5 MG/1
TABLET ORAL
Qty: 90 TABLET | Refills: 0 | OUTPATIENT
Start: 2020-12-03

## 2020-12-03 NOTE — TELEPHONE ENCOUNTER
These medications were just filled to the [facility] pharmacy. Called and spoke to patient. He was hoping to have them sent to the  mail pharmacy.     Will route to PCP to review    JUDITH Chandler, RN  [facility]

## 2020-12-04 NOTE — TELEPHONE ENCOUNTER
Reason for Call:  Other prescription    Detailed comments: pharmacy has questions on the Hydrochlorothiazide and the Losartan.  I appears they are in there twice.  Once as a combo and each one separately as well.  She would like to make sure this is what you want.  She states the Simvastatin is in there twice as well.  Please call to confirm.  She is ok with it waiting until Monday.    Phone Number Patient can be reached at: [phone]    Best Time: any    Can we leave a detailed message on this number? YES    Call taken on 12/4/2020 at 4:04 PM by David Kauffman

## 2020-12-07 NOTE — TELEPHONE ENCOUNTER
I want the Hyzaar and one Simvastatin.  The others can be discontinued.  There was a problems with prescriptions doing through with the transfer.      Electronically signed by Shahrzad Fajardo CNP.

## 2020-12-07 NOTE — TELEPHONE ENCOUNTER
Pharmacy advised on providers notes and will discontinue the order for hydrochlorothiazide separate from losartan. Patient has already picked up the Hyzaar from [place] [facility] #[id number]. Nothing further needed.  Shahrzad Philip, CMA

## 2021-01-15 ENCOUNTER — TRANSFERRED RECORDS (OUTPATIENT)
Dept: HEALTH INFORMATION MANAGEMENT | Facility: CLINIC | Age: 56
End: 2021-01-15

## 2021-03-22 DIAGNOSIS — I10 HTN, GOAL BELOW 140/90: ICD-10-CM

## 2021-03-22 RX ORDER — LOSARTAN POTASSIUM AND HYDROCHLOROTHIAZIDE 12.5; 5 MG/1; MG/1
TABLET ORAL
Qty: 30 TABLET | Refills: 0 | Status: SHIPPED | OUTPATIENT
Start: 2021-03-22 | End: 2021-03-30 | Stop reason: SINTOL

## 2021-03-22 NOTE — TELEPHONE ENCOUNTER
RN called pt to inform he is overdue for labs and mouna to Follow-up on his hypertension.  Shahrzad Fajardo has left [place], therefore he needs to pick a new provider for his care. He has scheduled an appt to Follow-up with RUTH Waters, on 3/30/21 at 10:40 AM . He is out of his medication- Hyzaar, will send in one month supply...........................TASH Monique

## 2021-03-22 NOTE — TELEPHONE ENCOUNTER
RN called pt back to reschedule appt for 3 PM, same day, as remembered  Pt needs longer appt to establish care with new provider......................TASH Monique

## 2021-03-30 ENCOUNTER — OFFICE VISIT (OUTPATIENT)
Dept: FAMILY MEDICINE | Facility: CLINIC | Age: 56
End: 2021-03-30
Payer: COMMERCIAL

## 2021-03-30 VITALS
WEIGHT: 283.6 LBS | SYSTOLIC BLOOD PRESSURE: 116 MMHG | HEART RATE: 72 BPM | RESPIRATION RATE: 20 BRPM | BODY MASS INDEX: 42 KG/M2 | TEMPERATURE: 97.6 F | OXYGEN SATURATION: 98 % | HEIGHT: 69 IN | DIASTOLIC BLOOD PRESSURE: 68 MMHG

## 2021-03-30 DIAGNOSIS — J63.2: ICD-10-CM

## 2021-03-30 DIAGNOSIS — E66.01 CLASS 3 SEVERE OBESITY DUE TO EXCESS CALORIES WITHOUT SERIOUS COMORBIDITY WITH BODY MASS INDEX (BMI) OF 40.0 TO 44.9 IN ADULT (H): ICD-10-CM

## 2021-03-30 DIAGNOSIS — I10 HTN, GOAL BELOW 140/90: Primary | ICD-10-CM

## 2021-03-30 DIAGNOSIS — E78.5 HYPERLIPIDEMIA LDL GOAL <130: ICD-10-CM

## 2021-03-30 DIAGNOSIS — E66.813 CLASS 3 SEVERE OBESITY DUE TO EXCESS CALORIES WITHOUT SERIOUS COMORBIDITY WITH BODY MASS INDEX (BMI) OF 40.0 TO 44.9 IN ADULT (H): ICD-10-CM

## 2021-03-30 PROCEDURE — 99214 OFFICE O/P EST MOD 30 MIN: CPT | Performed by: PHYSICIAN ASSISTANT

## 2021-03-30 RX ORDER — LOSARTAN POTASSIUM 50 MG/1
50 TABLET ORAL DAILY
Qty: 90 TABLET | Refills: 1 | Status: SHIPPED | OUTPATIENT
Start: 2021-03-30 | End: 2021-11-10

## 2021-03-30 RX ORDER — SIMVASTATIN 40 MG
40 TABLET ORAL AT BEDTIME
Qty: 90 TABLET | Refills: 3 | Status: SHIPPED | OUTPATIENT
Start: 2021-03-30 | End: 2022-06-27

## 2021-03-30 ASSESSMENT — MIFFLIN-ST. JEOR: SCORE: 2097.81

## 2021-03-30 ASSESSMENT — PAIN SCALES - GENERAL: PAINLEVEL: MILD PAIN (2)

## 2021-03-30 NOTE — NURSING NOTE
Health Maintenance Due   Topic Date Due     ADVANCE CARE PLANNING  Never done     ZOSTER IMMUNIZATION (1 of 2) Never done     PREVENTIVE CARE VISIT  01/14/2017     BMP  04/30/2020     INFLUENZA VACCINE (1) 09/01/2020     PHQ-2  01/01/2021     Kelsea MANCINI LPN

## 2021-03-30 NOTE — PROGRESS NOTES
"    Assessment & Plan     HTN, goal below 140/90  Obesity  Patient would like to discontinue hydrochlorothiazide as he has noticed that it causes a headache. I have refilled the medication to losartan 50mg. I have instructed the patient on healthy habits such as a healthy diet low in salts/sugars/fatty&greasy foods with regular servings of fruits and vegetables and try to get in 30 minutes of aerobic exercise 5 or more days each week as able.   Follow up in 1 month for BP recheck.   - losartan (COZAAR) 50 MG tablet  Dispense: 90 tablet; Refill: 1  - Basic metabolic panel  (Ca, Cl, CO2, Creat, Gluc, K, Na, BUN)    Hyperlipidemia LDL goal <130  The 10-year ASCVD risk score (Brandongil ZAMUDIO Jr., et al., 2013) is: 5%    Values used to calculate the score:      Age: 56 years      Sex: Male      Is Non- : No      Diabetic: No      Tobacco smoker: No      Systolic Blood Pressure: 116 mmHg      Is BP treated: Yes      HDL Cholesterol: 60 mg/dL      Total Cholesterol: 177 mg/dL  - simvastatin (ZOCOR) 40 MG tablet  Dispense: 90 tablet; Refill: 3    Berylliosis (H)  I will defer treatments to the patient's pulmonologist.          BMI:   Estimated body mass index is 42.19 kg/m  as calculated from the following:    Height as of this encounter: 1.746 m (5' 8.75\").    Weight as of this encounter: 128.6 kg (283 lb 9.6 oz).   Weight management plan: Discussed healthy diet and exercise guidelines      Return in about 1 month (around 4/30/2021) for BP Recheck.    EBONY Petersen [facility]    Erik Good is a 56 year old who presents for the following health issues     HPI     New Patient/Transfer of Care    Patient is a 56 year old male who presents today to establish care and for medication refill. He has been working with pulmonology to manage berylliosis. Currently on breo ellipta which he feels is managing his symptoms well. Patient says that he experiences swelling and pain " "in his ankles which comes on abruptly and limits his tolerance for exercise. He attributes this to the elevated BMI of 42.19. We discussed pool therapy with [facility] or at a local fitness center. Patient did not seem receptive to this.       Review of Systems   Constitutional, HEENT, cardiovascular, pulmonary, gi and gu systems are negative, except as otherwise noted.      Objective    /68 (BP Location: Right arm, Patient Position: Chair, Cuff Size: Adult Large)   Pulse 72   Temp 97.6  F (36.4  C) (Temporal)   Resp 20   Ht 1.746 m (5' 8.75\")   Wt 128.6 kg (283 lb 9.6 oz)   SpO2 98%   BMI 42.19 kg/m    Body mass index is 42.19 kg/m .  Physical Exam   GENERAL: healthy, alert and no distress  RESP: lungs clear to auscultation - no rales, rhonchi or wheezes  CV: regular rate and rhythm, normal S1 S2, no S3 or S4, no murmur, click or rub, no peripheral edema and peripheral pulses strong  MS: no gross musculoskeletal defects noted, no edema  NEURO: Normal strength and tone, mentation intact and speech normal  PSYCH: mentation appears normal, affect normal/bright    No results found for any visits on 03/30/21.            "

## 2021-10-20 ENCOUNTER — HOSPITAL ENCOUNTER (OUTPATIENT)
Dept: CT IMAGING | Facility: CLINIC | Age: 56
Discharge: HOME | End: 2021-10-20
Attending: INTERNAL MEDICINE | Admitting: INTERNAL MEDICINE
Payer: OTHER MISCELLANEOUS

## 2021-10-20 DIAGNOSIS — J63.2: ICD-10-CM

## 2021-10-20 DIAGNOSIS — Z51.81 ENCOUNTER FOR THERAPEUTIC DRUG LEVEL MONITORING: ICD-10-CM

## 2021-10-20 PROCEDURE — 71250 CT THORAX DX C-: CPT

## 2021-12-28 ENCOUNTER — TRANSFERRED RECORDS (OUTPATIENT)
Dept: HEALTH INFORMATION MANAGEMENT | Facility: CLINIC | Age: 56
End: 2021-12-28
Payer: COMMERCIAL

## 2022-06-22 DIAGNOSIS — E78.5 HYPERLIPIDEMIA LDL GOAL <130: ICD-10-CM

## 2022-06-22 DIAGNOSIS — I10 HTN, GOAL BELOW 140/90: ICD-10-CM

## 2022-06-24 RX ORDER — SIMVASTATIN 40 MG
40 TABLET ORAL AT BEDTIME
Qty: 90 TABLET | Refills: 3 | OUTPATIENT
Start: 2022-06-24

## 2022-06-24 NOTE — TELEPHONE ENCOUNTER
"Pending Prescriptions:                       Disp   Refills    simvastatin (ZOCOR) 40 MG tablet [Pharmacy*90 tab*3        Sig: Take 1 tablet (40 mg) by mouth At Bedtime    Routing refill request to provider for review/approval because:  Patient needs to be seen because it has been more than 1 year since last office visit.    Requested Prescriptions   Pending Prescriptions Disp Refills    simvastatin (ZOCOR) 40 MG tablet [Pharmacy Med Name: SIMVASTATIN 40MG TABS] 90 tablet 3     Sig: Take 1 tablet (40 mg) by mouth At Bedtime        Statins Protocol Failed - 6/22/2022  7:40 PM        Failed - LDL on file in past 12 months     Recent Labs   Lab Test 10/31/19  1057   LDL 87               Failed - Recent (12 mo) or future (30 days) visit within the authorizing provider's specialty     Patient has had an office visit with the authorizing provider or a provider within the authorizing providers department within the previous 12 mos or has a future within next 30 days. See \"Patient Info\" tab in inbasket, or \"Choose Columns\" in Meds & Orders section of the refill encounter.              Passed - No abnormal creatine kinase in past 12 months     No lab results found.             Passed - Medication is active on med list        Passed - Patient is age 18 or older                  "

## 2022-06-24 NOTE — TELEPHONE ENCOUNTER
Patient has not been since March 2021. He needs an appointment in order to continue medication. Please help him schedule annual checkup and I can fill medication to get him to that date.     Antolin Coelho PA-C on 6/24/2022 at 6:30 PM

## 2022-06-27 RX ORDER — LOSARTAN POTASSIUM 50 MG/1
50 TABLET ORAL DAILY
Qty: 17 TABLET | Refills: 0 | Status: SHIPPED | OUTPATIENT
Start: 2022-06-27 | End: 2022-07-14

## 2022-06-27 RX ORDER — SIMVASTATIN 40 MG
40 TABLET ORAL AT BEDTIME
Qty: 17 TABLET | Refills: 0 | Status: SHIPPED | OUTPATIENT
Start: 2022-06-27 | End: 2022-07-14

## 2022-06-27 NOTE — TELEPHONE ENCOUNTER
Pt called in and has appointment made for 7/14 with Dr. Khanna due to you leaving [place].  Also will not have enough losartan to last until appt.

## 2022-07-14 ENCOUNTER — OFFICE VISIT (OUTPATIENT)
Dept: INTERNAL MEDICINE | Facility: CLINIC | Age: 57
End: 2022-07-14
Payer: COMMERCIAL

## 2022-07-14 VITALS
HEIGHT: 68 IN | RESPIRATION RATE: 18 BRPM | HEART RATE: 72 BPM | TEMPERATURE: 97.6 F | BODY MASS INDEX: 44.5 KG/M2 | SYSTOLIC BLOOD PRESSURE: 118 MMHG | WEIGHT: 293.6 LBS | OXYGEN SATURATION: 98 % | DIASTOLIC BLOOD PRESSURE: 60 MMHG

## 2022-07-14 DIAGNOSIS — I10 HTN, GOAL BELOW 140/90: ICD-10-CM

## 2022-07-14 DIAGNOSIS — Z12.5 SCREENING FOR PROSTATE CANCER: Primary | ICD-10-CM

## 2022-07-14 DIAGNOSIS — R73.09 ELEVATED GLUCOSE: ICD-10-CM

## 2022-07-14 DIAGNOSIS — E78.5 HYPERLIPIDEMIA LDL GOAL <130: ICD-10-CM

## 2022-07-14 LAB
ANION GAP SERPL CALCULATED.3IONS-SCNC: 3 MMOL/L (ref 3–14)
BUN SERPL-MCNC: 16 MG/DL (ref 7–30)
CALCIUM SERPL-MCNC: 9 MG/DL (ref 8.5–10.1)
CHLORIDE BLD-SCNC: 113 MMOL/L (ref 94–109)
CHOLEST SERPL-MCNC: 141 MG/DL
CO2 SERPL-SCNC: 27 MMOL/L (ref 20–32)
CREAT SERPL-MCNC: 1.34 MG/DL (ref 0.66–1.25)
FASTING STATUS PATIENT QL REPORTED: YES
GFR SERPL CREATININE-BSD FRML MDRD: 61 ML/MIN/1.73M2
GLUCOSE BLD-MCNC: 98 MG/DL (ref 70–99)
HBA1C MFR BLD: 5.6 % (ref 0–5.6)
HDLC SERPL-MCNC: 54 MG/DL
LDLC SERPL CALC-MCNC: 57 MG/DL
NONHDLC SERPL-MCNC: 87 MG/DL
POTASSIUM BLD-SCNC: 4.3 MMOL/L (ref 3.4–5.3)
PSA SERPL-MCNC: 0.93 UG/L (ref 0–4)
SODIUM SERPL-SCNC: 143 MMOL/L (ref 133–144)
TRIGL SERPL-MCNC: 148 MG/DL

## 2022-07-14 PROCEDURE — 80048 BASIC METABOLIC PNL TOTAL CA: CPT | Performed by: INTERNAL MEDICINE

## 2022-07-14 PROCEDURE — 83036 HEMOGLOBIN GLYCOSYLATED A1C: CPT | Performed by: INTERNAL MEDICINE

## 2022-07-14 PROCEDURE — 80061 LIPID PANEL: CPT | Performed by: INTERNAL MEDICINE

## 2022-07-14 PROCEDURE — G0103 PSA SCREENING: HCPCS | Performed by: INTERNAL MEDICINE

## 2022-07-14 PROCEDURE — 36415 COLL VENOUS BLD VENIPUNCTURE: CPT | Performed by: INTERNAL MEDICINE

## 2022-07-14 PROCEDURE — 99214 OFFICE O/P EST MOD 30 MIN: CPT | Performed by: INTERNAL MEDICINE

## 2022-07-14 RX ORDER — SIMVASTATIN 40 MG
40 TABLET ORAL AT BEDTIME
Qty: 17 TABLET | Refills: 0 | Status: SHIPPED | OUTPATIENT
Start: 2022-07-14 | End: 2022-07-27

## 2022-07-14 RX ORDER — LOSARTAN POTASSIUM 50 MG/1
50 TABLET ORAL DAILY
Qty: 17 TABLET | Refills: 0 | Status: SHIPPED | OUTPATIENT
Start: 2022-07-14 | End: 2022-07-25

## 2022-07-14 ASSESSMENT — PAIN SCALES - GENERAL: PAINLEVEL: MILD PAIN (3)

## 2022-07-14 NOTE — LETTER
July 25, 2022      Florentino Galleogs  [address]  [place] [address]        Dear ,    We are writing to inform you of your test results.    The prostate-specific antigen is consistent with a low risk of prostate cancer.   The cholesterol is within acceptable limits.   The chemistry panel is normal including the blood sugar and kidney function.   The hemoglobin A1c is 5.6 suggesting excellent blood sugar control.     You will be contacted with any outstanding results as they are available.   Feel free to contact me via the office or My Chart if you have any questions regarding the above.       Resulted Orders   BASIC METABOLIC PANEL   Result Value Ref Range    Sodium 143 133 - 144 mmol/L    Potassium 4.3 3.4 - 5.3 mmol/L    Chloride 113 (H) 94 - 109 mmol/L    Carbon Dioxide (CO2) 27 20 - 32 mmol/L    Anion Gap 3 3 - 14 mmol/L    Urea Nitrogen 16 7 - 30 mg/dL    Creatinine 1.34 (H) 0.66 - 1.25 mg/dL    Calcium 9.0 8.5 - 10.1 mg/dL    Glucose 98 70 - 99 mg/dL    GFR Estimate 61 >60 mL/min/1.73m2      Comment:      Effective December 21, 2021 eGFRcr in adults is calculated using the 2021 CKD-EPI creatinine equation which includes age and gender (Arabella et al., NEJ, DOI: 10.1056/ESOYlz1346580)   Lipid panel reflex to direct LDL Fasting   Result Value Ref Range    Cholesterol 141 <200 mg/dL    Triglycerides 148 <150 mg/dL    Direct Measure HDL 54 >=40 mg/dL    LDL Cholesterol Calculated 57 <=100 mg/dL    Non HDL Cholesterol 87 <130 mg/dL    Patient Fasting > 8hrs? Yes     Narrative    Cholesterol  Desirable:  <200 mg/dL    Triglycerides  Normal:  Less than 150 mg/dL  Borderline High:  150-199 mg/dL  High:  200-499 mg/dL  Very High:  Greater than or equal to 500 mg/dL    Direct Measure HDL  Female:  Greater than or equal to 50 mg/dL   Male:  Greater than or equal to 40 mg/dL    LDL Cholesterol  Desirable:  <100mg/dL  Above Desirable:  100-129 mg/dL   Borderline High:  130-159 mg/dL   High:  160-189 mg/dL    Very High:  >= 190 mg/dL    Non HDL Cholesterol  Desirable:  130 mg/dL  Above Desirable:  130-159 mg/dL  Borderline High:  160-189 mg/dL  High:  190-219 mg/dL  Very High:  Greater than or equal to 220 mg/dL   Hemoglobin A1c   Result Value Ref Range    Hemoglobin A1C 5.6 0.0 - 5.6 %      Comment:      Normal <5.7%   Prediabetes 5.7-6.4%    Diabetes 6.5% or higher     Note: Adopted from ADA consensus guidelines.   PSA, screen   Result Value Ref Range    Prostate Specific Antigen Screen 0.93 0.00 - 4.00 ug/L    Narrative    Assay Method:  Chemiluminescence using Siemens   Vista analyzer.     If you have any questions or concerns, please call the clinic at the number listed above.       Sincerely,      Tad Khanna, DO

## 2022-07-14 NOTE — PROGRESS NOTES
"      Erik Good is a 57 year old, presenting for the following health issues:  Hypertension and Lipids      History of Present Illness       Hypertension: He presents for follow up of hypertension.  He does not check blood pressure  regularly outside of the clinic. Outside blood pressures have been over 140/90. He does not follow a low salt diet.              EMR reviewed including:             Complaint, History of Chief Complaint, Corresponding Review of Systems, and Complaint Specific Physical Examination.    #1   Follow-up on hypertension  Currently taking Cozaar 50 mg daily.  Tolerating it well.  Denies any chest pain, headaches, or signs of hypertensive urgency.  Denies symptoms of hypotension        Exam:   HEART:  regular without rubs, clicks, gallops, or murmurs. PMI is nondisplaced. Upstrokes are brisk. S1,S2 are heard.   LUNGS: clear bilaterally, airflow is brisk, no intercostal retraction or stridor is noted. No coughing is noted during visit.   NEURO: Pt is alert and appropriate. No neurologic lateralization is noted. Cranial nerves 2-12 are intact. Peripheral sensory and motor function are grossly normal.       #2   History of elevated blood glucose  Never told that he was \"diabetic\".  Had an A1c of 9.2 in 2016  Denies polyuria or polydipsia.  Denies significant weight loss.  Denies proteinuria.  Does not follow any particular low carbohydrate diet.        Exam:  As above.        Patient has been interviewed, applicable history and applied review of systems have been performed.    Vital Signs:   /60 (BP Location: Right arm, Patient Position: Chair, Cuff Size: Adult Large)   Pulse 72   Temp 97.6  F (36.4  C) (Temporal)   Resp 18   Ht 1.727 m (5' 8\")   Wt 133.2 kg (293 lb 9.6 oz)   SpO2 98%   BMI 44.64 kg/m        Decision Making    Problem and Complexity     1. HTN, goal below 140/90  Continue losartan.  Check renal function and electrolytes  - losartan (COZAAR) 50 MG tablet; Take " 1 tablet (50 mg) by mouth daily - DUE FOR ANNUAL CHECKUP PRIOR TO ADDTL REFILL  Dispense: 17 tablet; Refill: 0    2. Elevated glucose  Check A1c and renal function  - BASIC METABOLIC PANEL; Future  - Hemoglobin A1c; Future  - BASIC METABOLIC PANEL  - Hemoglobin A1c    3. Hyperlipidemia LDL goal <130  Check lipid.  Continue statin therapy.  Check liver function  - simvastatin (ZOCOR) 40 MG tablet; Take 1 tablet (40 mg) by mouth At Bedtime  Dispense: 17 tablet; Refill: 0  - Lipid panel reflex to direct LDL Fasting; Future  - Lipid panel reflex to direct LDL Fasting    4. Screening for prostate cancer  A PSA.   - PSA, screen; Future  - PSA, screen                                FOLLOW UP   I have asked the patient to make an appointment for followup with me in 6 months or as predicated by his results        I have carefully explained the diagnosis and treatment options to the patient.  The patient has displayed an understanding of the above, and all subsequent questions were answered.      DO SRINATH Delatorre    Portions of this note were produced using [facility]  Although every attempt at real-time proof reading has been made, occasional grammar/syntax errors may have been missed.

## 2022-07-15 DIAGNOSIS — I10 HTN, GOAL BELOW 140/90: ICD-10-CM

## 2022-07-15 DIAGNOSIS — E78.5 HYPERLIPIDEMIA LDL GOAL <130: ICD-10-CM

## 2022-07-19 RX ORDER — SIMVASTATIN 40 MG
40 TABLET ORAL AT BEDTIME
Qty: 17 TABLET | Refills: 0 | OUTPATIENT
Start: 2022-07-19

## 2022-07-19 RX ORDER — LOSARTAN POTASSIUM 50 MG/1
50 TABLET ORAL DAILY
Qty: 17 TABLET | Refills: 0 | OUTPATIENT
Start: 2022-07-19

## 2022-07-19 NOTE — TELEPHONE ENCOUNTER
Patient is overdue for office visit. Kathleen period has been provided and patient did not schedule. Please help patient schedule appointment and I can fill medication to get them to that date.     Antolin Coelho PA-C on 7/19/2022 at 5:35 PM

## 2022-07-19 NOTE — TELEPHONE ENCOUNTER
Losartan  Routing refill request to provider for review/approval because:  Patient needs to be seen because it has been more than 1 year since last office visit.    Simvastatin  Routing refill request to provider for review/approval because:  Patient needs to be seen because it has been more than 1 year since last office visit.    Sandeep Gilbert RN

## 2022-07-20 NOTE — TELEPHONE ENCOUNTER
If patient was seen then the refill requests need to be sent to the provider who saw the patient.     Thanks,  Antolin Coelho PA-C on 7/20/2022 at 5:11 PM

## 2022-07-21 NOTE — TELEPHONE ENCOUNTER
Will route to provider patient seen on 7/14/22 to see if that provider will fill med.  Esperanza Sahni CMA (AAMA)

## 2022-07-25 RX ORDER — LOSARTAN POTASSIUM 50 MG/1
50 TABLET ORAL DAILY
Qty: 90 TABLET | Refills: 3 | Status: SHIPPED | OUTPATIENT
Start: 2022-07-25 | End: 2023-05-26

## 2022-09-08 ENCOUNTER — HOSPITAL ENCOUNTER (EMERGENCY)
Facility: CLINIC | Age: 57
Discharge: HOME | End: 2022-09-08
Attending: EMERGENCY MEDICINE | Admitting: EMERGENCY MEDICINE
Payer: COMMERCIAL

## 2022-09-08 ENCOUNTER — TELEPHONE (OUTPATIENT)
Dept: FAMILY MEDICINE | Facility: OTHER | Age: 57
End: 2022-09-08

## 2022-09-08 ENCOUNTER — TELEPHONE (OUTPATIENT)
Dept: FAMILY MEDICINE | Facility: CLINIC | Age: 57
End: 2022-09-08

## 2022-09-08 VITALS
WEIGHT: 285.5 LBS | OXYGEN SATURATION: 99 % | BODY MASS INDEX: 43.41 KG/M2 | SYSTOLIC BLOOD PRESSURE: 154 MMHG | HEART RATE: 72 BPM | RESPIRATION RATE: 16 BRPM | DIASTOLIC BLOOD PRESSURE: 74 MMHG | TEMPERATURE: 98.3 F

## 2022-09-08 DIAGNOSIS — S00.33XA CONTUSION OF NOSE, INITIAL ENCOUNTER: ICD-10-CM

## 2022-09-08 DIAGNOSIS — S01.511A LACERATION OF LOWER LIP, INITIAL ENCOUNTER: ICD-10-CM

## 2022-09-08 PROCEDURE — 99283 EMERGENCY DEPT VISIT LOW MDM: CPT | Mod: 25

## 2022-09-08 PROCEDURE — 12013 RPR F/E/E/N/L/M 2.6-5.0 CM: CPT

## 2022-09-08 PROCEDURE — 12013 RPR F/E/E/N/L/M 2.6-5.0 CM: CPT | Performed by: EMERGENCY MEDICINE

## 2022-09-08 PROCEDURE — 99282 EMERGENCY DEPT VISIT SF MDM: CPT | Mod: 25 | Performed by: EMERGENCY MEDICINE

## 2022-09-08 ASSESSMENT — ACTIVITIES OF DAILY LIVING (ADL): ADLS_ACUITY_SCORE: 33

## 2022-09-08 NOTE — TELEPHONE ENCOUNTER
"Patients wife calling stating patient sliced left hand open at home about 1/4\" deep. Requesting to speak to RN to ask if he needs to go to ED or Urgent Care.   "

## 2022-09-08 NOTE — ED PROVIDER NOTES
History     Chief Complaint   Patient presents with     Facial Injury     HPI  Florentino Gallegos is a 57 year old male who presents to the emergency department secondary to a facial injury.  He had a the blade from the [employer] bailer hit him in the face when it kicked to throw a bale.  Injury to the mouth into the nose.  Laceration to the lower lip seems to be all the way through.  No loc.  Nose hit as well and is painful.  He had a nose bleed that has stopped. It did not knock out any teeth.  Nose is straight.  tdap is utd.     Allergies:  Allergies   Allergen Reactions     No Known Drug Allergies        Problem List:    Patient Active Problem List    Diagnosis Date Noted     LESLEY (generalized anxiety disorder) 12/11/2017     Priority: Medium     Morbid obesity due to excess calories (H) 05/12/2017     Priority: Medium     Berylliosis (H) 12/15/2016     Priority: Medium     Abnormal chest CT 05/31/2016     Priority: Medium     Low back pain 01/21/2015     Priority: Medium     Acute gouty arthritis 09/03/2014     Priority: Medium     HTN, goal below 140/90 01/04/2014     Priority: Medium     HYPERLIPIDEMIA LDL GOAL <130 10/31/2010     Priority: Medium        Past Medical History:    Past Medical History:   Diagnosis Date     Abnormal chest CT 5/31/2016     Acute gouty arthritis 9/3/2014     LESLEY (generalized anxiety disorder) 12/11/2017     Low back pain 1/21/2015     MEDICAL HISTORY OF -      Obesity 12/15/2014       Past Surgical History:    Past Surgical History:   Procedure Laterality Date     COLONOSCOPY N/A 1/7/2015    Procedure: COLONOSCOPY;  Surgeon: Anna Holbrook MD;  Location: [facility]     ZZC NONSPECIFIC PROCEDURE      Rt ankle fx-pins     ZZC NONSPECIFIC PROCEDURE      appendectomy       Family History:    Family History   Problem Relation Age of Onset     Diabetes Father      Diabetes Mother        Social History:  Marital Status:   [2]  Social History     Tobacco Use     Smoking status: Never  Smoker     Smokeless tobacco: Never Used   Substance Use Topics     Alcohol use: No     Alcohol/week: 0.0 standard drinks     Drug use: No        Medications:    albuterol (PROAIR HFA, PROVENTIL HFA, VENTOLIN HFA) 108 (90 BASE) MCG/ACT inhaler  fluticasone-vilanterol (BREO ELLIPTA) 100-25 MCG/INH inhaler  FOLIC ACID PO  losartan (COZAAR) 50 MG tablet  simvastatin (ZOCOR) 40 MG tablet          Review of Systems   All other systems reviewed and are negative.      Physical Exam   BP: (!) 176/91  Pulse: 72  Temp: 98.3  F (36.8  C)  Resp: 16  Weight: 129.5 kg (285 lb 8 oz)  SpO2: 99 %      Physical Exam  Vitals and nursing note reviewed.   Constitutional:       General: He is not in acute distress.     Appearance: He is well-developed. He is not diaphoretic.   HENT:      Head: Normocephalic and atraumatic.      Right Ear: External ear normal.      Left Ear: External ear normal.      Nose: Nose normal.   Eyes:      General: No scleral icterus.     Extraocular Movements: Extraocular movements intact.      Conjunctiva/sclera: Conjunctivae normal.      Pupils: Pupils are equal, round, and reactive to light.   Cardiovascular:      Rate and Rhythm: Normal rate.   Pulmonary:      Effort: Pulmonary effort is normal.   Abdominal:      Tenderness: There is no abdominal tenderness. There is no guarding.   Musculoskeletal:         General: Normal range of motion.      Cervical back: Normal range of motion and neck supple.   Skin:     General: Skin is warm and dry.      Findings: No rash.      Comments: 2 cm linear laceration on the inside of the lower lip corresponding with a through and through laceration to the outside below the vermilion border approximately 1 cm laceration there.  Teeth are stable.  Gumline is tender in the upper mouth over the maxilla.  External palpation of the maxilla reveals no fluctuance or tenderness or movement.  There is no crepitus.  No septal hematoma on nasal exam.  There is some dried blood in the  right nare.  The nose is straight.   Neurological:      General: No focal deficit present.      Mental Status: He is alert and oriented to person, place, and time.   Psychiatric:         Mood and Affect: Mood normal.         ED Course                 Procedures     [facility] Procedure Note        Laceration Repair:    Performed by: Jay Knowles MD  Authorized by: Jay Knowles MD  Consent given by: Patient who states understanding of the procedure being performed after discussing the risks, benefits and alternatives.    Preparation: Patient was prepped and draped in usual sterile fashion.  Irrigation solution: saline    Body area: lower lip  Laceration length: 2cm on the inside lower lip, 1 cm on the outside  Contamination: The wound is not contaminated.  Foreign bodies:none  Tendon involvement: none  Anesthesia: Nerve block  Local anesthetic: Bupivacaine 0.5%  Anesthetic total: 4ml    Debridement: none  Skin closure: Closed with 4  4-0 vicryl on the inside and 3 x 4.0 Ethilon  Technique: interrupted  Approximation: close  Approximation difficulty: simple    Patient tolerance: Patient tolerated the procedure well with no immediate complications.           No results found for this or any previous visit (from the past 24 hour(s)).    Medications - No data to display    Assessments & Plan (with Medical Decision Making)  Nasal contusion, through and through lower lip laceration not crossing the vermilion border. Repaired as above.  No loc, confusion, headache, altered mental status or step off crepitus of the jaw and maxilla.  No loose teeth.  Patient does not think anything is broken.  After discussion we did not go forward with CT of the face or head.   Return to er precautions wound care, suture removal etc. discussed with the competent patient who agrees with the plan.     I have reviewed the nursing notes.    I have reviewed the findings, diagnosis, plan and need for follow up with the  patient.      New Prescriptions    No medications on file       Final diagnoses:   Laceration of lower lip, initial encounter   Contusion of nose, initial encounter       9/8/2022   [facility] EMERGENCY DEPT     Jay Knowles MD  09/08/22 [id number]       Jay Knowles MD  09/08/22 [id number]

## 2022-09-08 NOTE — TELEPHONE ENCOUNTER
Patient working and cut open bottom lip.    Wife states about a 1/4 inch wide and will probably need stitched.      Bleeding is currently under better control but the area is wide.    Advised to go to [place] ED for evaluation.     Wife agreed to plan and will be driving patient to ED.    Jackelyn Miner RN  [facility] ~ Registered Nurse  Clinic Triage ~ [place]  September 8, 2022'

## 2022-09-08 NOTE — DISCHARGE INSTRUCTIONS
Keep wound clean and dry.  Apply antibiotic ointment daily to the outer wound.  The inner sutures will dissolve on their own.  The ones on the outside will need to be taken out.  There are 3 of them.  Hopefully this heals up quickly.  If you get new symptoms please return to the emergency department.  Keep antibiotic ointment on the wound.  Avoid spicy or real hot foods as this may cause more pain on the laceration on your lip.  You can use a straw to help with drinking fluids.  It was a pleasure to meet you.
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
## 2022-09-15 ENCOUNTER — ALLIED HEALTH/NURSE VISIT (OUTPATIENT)
Dept: FAMILY MEDICINE | Facility: CLINIC | Age: 57
End: 2022-09-15
Payer: COMMERCIAL

## 2022-09-15 DIAGNOSIS — Z48.02 VISIT FOR SUTURE REMOVAL: Primary | ICD-10-CM

## 2022-09-15 PROCEDURE — 99207 PR NO CHARGE NURSE ONLY: CPT

## 2022-09-15 NOTE — PROGRESS NOTES
Florentino Gallegos presents to the clinic today for removal of sutures.  The patient has had the sutures in place for 7 days.  There has been no history of infection or drainage.  3 sutures are seen located on the lower lip.  The wound is healing well with no signs of infection.  Tetanus status is up to date.   All sutures were easily removed today.  Routine wound care discussed.  The patient will follow up as needed.    Marcie Carrizales,DANIELN, RN

## 2023-02-19 DIAGNOSIS — E78.5 HYPERLIPIDEMIA LDL GOAL <130: ICD-10-CM

## 2023-02-19 NOTE — LETTER
[address] [address]  [phone]        February 22, 2023    Florentino Gallegos  [facility] [place] [id number]          Dear Florentino,    We are concerned about your health care.  We recently provided you with a medication refill.  Many medications require routine follow-up with your Doctor.      At this time we ask that: You schedule an appointment for your annual physical. Call the clinic at [phone] Option 1 to schedule.     Your prescription:  Has been filled. Please schedule a follow up visit for first available appointment. Courtesy refills will be given if needed until your scheduled appointment.       Thank you,      Sincerely,        Tad Khanna, DO

## 2023-02-21 RX ORDER — SIMVASTATIN 40 MG
TABLET ORAL
Qty: 90 TABLET | Refills: 0 | Status: SHIPPED | OUTPATIENT
Start: 2023-02-21 | End: 2023-05-24

## 2023-02-21 NOTE — TELEPHONE ENCOUNTER
Zocor  Prescription approved per [facility] Refill Protocol.    Sending to scheduling for yearly office visit due

## 2023-04-27 NOTE — PROGRESS NOTES
SUBJECTIVE:   Florentino Gallegos is a 53 year old male who presents to clinic today for the following health issues:      Edema - bilateral. Left leg painful,    Foot pain      Duration: 2 months ago.    Description (location/character/radiation): lt foot mainly the ball of the foot, also on the ankle area, also pain in the Rt foot, with some mild swelling in the lower legs bilaterally.    Intensity:  moderate    Accompanying signs and symptoms: swelling in both legs at the end of the day, this is getting better.    History (similar episodes/previous evaluation): pt was seen by his pulmonary specialist who recommend to see his primary.    Precipitating or alleviating factors: resting makes it better, and also at the beginning of the day.    Therapies tried and outcome: elevation and he tried cam walker on the Rt foot.       Problem list and histories reviewed & adjusted, as indicated.  Additional history: as documented    Patient Active Problem List   Diagnosis     HYPERLIPIDEMIA LDL GOAL <130     HTN, goal below 140/90     Acute gouty arthritis     Low back pain     Abnormal chest CT     Berylliosis (H)     Morbid obesity due to excess calories (H)     LESLEY (generalized anxiety disorder)     Past Surgical History:   Procedure Laterality Date     C NONSPECIFIC PROCEDURE      Rt ankle fx-pins     C NONSPECIFIC PROCEDURE      appendectomy     COLONOSCOPY N/A 1/7/2015    Procedure: COLONOSCOPY;  Surgeon: Anna Holbrook MD;  Location:  GI       Social History   Substance Use Topics     Smoking status: Never Smoker     Smokeless tobacco: Never Used     Alcohol use No     Family History   Problem Relation Age of Onset     Diabetes Father      Diabetes Mother          Current Outpatient Prescriptions   Medication Sig Dispense Refill     albuterol (PROAIR HFA, PROVENTIL HFA, VENTOLIN HFA) 108 (90 BASE) MCG/ACT inhaler Inhale 2 puffs into the lungs every 4 hours as needed 3 Inhaler 3     DULOXETINE HCL PO         FOLIC ACID PO Take 6 mg by mouth daily       losartan-hydrochlorothiazide (HYZAAR) 50-12.5 MG per tablet Take 1 tablet by mouth daily 90 tablet 1     order for DME Equipment being ordered: compression stockings 1 Units 0     simvastatin (ZOCOR) 40 MG tablet Take 1 tablet (40 mg) by mouth At Bedtime 90 tablet 3     fluticasone-vilanterol (BREO ELLIPTA) 100-25 MCG/INH inhaler Inhale 1 puff into the lungs       Allergies   Allergen Reactions     No Known Drug Allergies      Recent Labs   Lab Test  10/23/18   1038  03/14/18   1002  12/11/17   1512  10/09/17   1049  05/12/17   1054 04/12/17 02/23/17   0948   01/14/16   1653  12/15/14   1628   11/27/13   1608   A1C  5.2   --   5.4   --    --    --   5.5   < >   --    --    --    --    LDL   --    --    --    --   118*   --    --    --   128*  78   --   84   HDL   --    --    --    --   43   --    --    --   49  43   --   62   TRIG   --    --    --    --   127   --    --    --   148  166*   --   137   ALT   --   24   --   28   --   36   --    --    --    --    --   44   CR   --    --   1.18   --   1.07  1.2  1.10   < >  1.24  1.22   < >  1.39*   GFRESTIMATED   --    --   64   --   72   --   70   < >  61  63   < >  54*   GFRESTBLACK   --    --   78   --   87   --   85   < >  74  76   < >  66   POTASSIUM   --    --   3.7   --   4.2  3.7  3.2*   < >  3.9  3.7   < >  4.1   TSH   --    --    --    --    --    --   2.71   --    --    --    --   2.70    < > = values in this interval not displayed.      BP Readings from Last 3 Encounters:   10/23/18 128/74   09/13/18 132/80   12/11/17 139/87    Wt Readings from Last 3 Encounters:   10/23/18 287 lb (130.2 kg)   12/11/17 274 lb (124.3 kg)   05/26/17 262 lb (118.8 kg)                    Reviewed and updated as needed this visit by clinical staff  Tobacco  Allergies  Meds  Med Hx  Surg Hx  Fam Hx  Soc Hx      Reviewed and updated as needed this visit by Provider         ROS:  CONSTITUTIONAL:weight gain.   RESP:shortness of  breath that is chronic.  CV: NEGATIVE for chest pain, palpitations     OBJECTIVE:     /74  Pulse 89  Temp 98.3  F (36.8  C) (Oral)  Resp 20  Wt 287 lb (130.2 kg)  BMI 42.38 kg/m2  Body mass index is 42.38 kg/(m^2).  GENERAL: healthy, alert and no distress  NECK: no adenopathy, no asymmetry, masses, or scars and thyroid normal to palpation  RESP: lungs clear to auscultation - no rales, rhonchi or wheezes  CV: regular rate and rhythm, normal S1 S2, no S3 or S4, no murmur, click or rub, no peripheral edema and peripheral pulses strong  MS: no edema seen today, there is mild tenderness on the Lt foot mainly around the medial part of the ankle, with moderate tenderness on the ball of the foot.  No tendernesson the heels bilaterally, normal ROM of the toes and ankles.    Diagnostic Test Results:  Results for orders placed or performed in visit on 10/23/18 (from the past 24 hour(s))   Hemoglobin A1c   Result Value Ref Range    Hemoglobin A1C 5.2 0 - 5.6 %   CBC with platelets   Result Value Ref Range    WBC 8.1 4.0 - 11.0 10e9/L    RBC Count 4.48 4.4 - 5.9 10e12/L    Hemoglobin 13.8 13.3 - 17.7 g/dL    Hematocrit 42.0 40.0 - 53.0 %    MCV 94 78 - 100 fl    MCH 30.8 26.5 - 33.0 pg    MCHC 32.9 31.5 - 36.5 g/dL    RDW 13.8 10.0 - 15.0 %    Platelet Count 220 150 - 450 10e9/L       ASSESSMENT/PLAN:             1. Morbid obesity due to excess calories (H)  Today I counseled the patient about diet, regular exercise and weight loss planning.      2. Berylliosis (H)  Pt to continue following up with pulmonary specialist.    3. Leg edema  Mostly venous stasis, I recommend  leg elevation (when possible), leg exercises (ankle flexion, walking) to increase calf muscle strength, and compression hosiery  talked about weight loss    - Basic metabolic panel  - Hemoglobin A1c  - CBC with platelets    4. Left foot pain  Continue using cam walker for 1 week, then may take it off and start on walking gradually, mean while, may use  NSAID's for short time.   - PHYSICAL THERAPY REFERRAL; Future    Follow up in 1 month if no improvement.    Dimitris Bansal MD  [facility]     [FreeTextEntry3] : \par Injection Procedure Note:\par \par The risks, benefits, and alternatives to corticosteroid injection were reviewed with the patient.  Risks outlined include but are not limited to infection, sepsis, bleeding, scarring, skin discoloration, temporary increase in pain, syncopal episode, failure to resolve symptoms, symptoms recurrence, allergic reaction, flare reaction, and elevation of blood sugar in diabetics.  Patient understood the risks and asked to proceed with this treatment course.\par \par Patient Identification\par Name/: Verbal with patient and/or family\par \par Procedure Verification:\par Procedure confirmed with patient or family/designee\par Consent for procedure: Verbal Consent Given\par Relevant documentation completed, reviewed, and signed\par Clinical indications for procedure confirmed\par \par Time-out with all members of procedure team immediately prior to procedure:\par Correct patient identified. Agreement on procedure. Correct side and site.\par \par KNEE INJECTION (STEROID) - LEFT\par After verbal consent and identification of the correct patient and correct site, the superolateral left knee was prepped using alcohol swabs and betadine. This was allowed time to air dry. A mixture of 1cc DepoMedrol 40mg/ml, 3cc Lidocaine 1%, and 3cc Bupivacaine 0.5% was injected into the suprapatellar pouch using a sterile 22G needle after ethyl chloride spray for skin anesthesia. The patient tolerated the procedure well. After-care instructions were provided and included instructions to ice the area and to call if redness, pain, or fever develop.\par

## 2023-05-24 DIAGNOSIS — I10 HTN, GOAL BELOW 140/90: ICD-10-CM

## 2023-05-24 DIAGNOSIS — E78.5 HYPERLIPIDEMIA LDL GOAL <130: ICD-10-CM

## 2023-05-24 NOTE — TELEPHONE ENCOUNTER
Medication Question or Refill    Contacts       Type Contact Phone/Fax    05/24/2023 08:52 AM CDT Phone (Incoming) Lilli Gallegos (Emergency Contact) [phone]          What medication are you calling about (include dose and sig)?: SIMVASTATIN, losartan    Preferred Pharmacy:      [facility] - [place], [place] - [address] [facility] [address]  [address] [address]  Phone: [phone] Fax: [phone]      Controlled Substance Agreement on file:   CSA -- Patient Level:    CSA: None found at the patient level.       Who prescribed the medication?: MISBAH RODGERS, DO    Do you need a refill? Yes    When did you use the medication last? 5/23/23    Patient offered an appointment? Yes: 7/11/23    Do you have any questions or concerns?  No      Okay to leave a detailed message?: Yes at Cell number on file:    Telephone Information:   Mobile [phone]

## 2023-05-26 RX ORDER — SIMVASTATIN 40 MG
TABLET ORAL
Qty: 90 TABLET | Refills: 0 | Status: SHIPPED | OUTPATIENT
Start: 2023-05-26 | End: 2023-07-11

## 2023-05-26 RX ORDER — LOSARTAN POTASSIUM 50 MG/1
50 TABLET ORAL DAILY
Qty: 90 TABLET | Refills: 3 | Status: SHIPPED | OUTPATIENT
Start: 2023-05-26 | End: 2023-07-11

## 2023-05-26 NOTE — TELEPHONE ENCOUNTER
"Zocor  Prescription approved per [facility] Refill Protocol.    Cozaar  Routing refill request to provider for review/approval because:  Requested Prescriptions   Pending Prescriptions Disp Refills     losartan (COZAAR) 50 MG tablet 90 tablet 3     Sig: Take 1 tablet (50 mg) by mouth daily - DUE FOR ANNUAL CHECKUP PRIOR TO ADDTL REFILL       Angiotensin-II Receptors Failed - 5/24/2023  9:25 AM        Failed - Last blood pressure under 140/90 in past 12 months     BP Readings from Last 3 Encounters:   09/08/22 (!) 154/74   07/14/22 118/60   03/30/21 116/68                 Failed - Normal serum creatinine on file in past 12 months     Recent Labs   Lab Test 07/14/22  1328   CR 1.34*       Ok to refill medication if creatinine is low          Passed - Recent (12 mo) or future (30 days) visit within the authorizing provider's specialty     Patient has had an office visit with the authorizing provider or a provider within the authorizing providers department within the previous 12 mos or has a future within next 30 days. See \"Patient Info\" tab in inbasket, or \"Choose Columns\" in Meds & Orders section of the refill encounter.            Slime Encinas RN    "

## 2023-07-11 ENCOUNTER — OFFICE VISIT (OUTPATIENT)
Dept: INTERNAL MEDICINE | Facility: CLINIC | Age: 58
End: 2023-07-11
Payer: COMMERCIAL

## 2023-07-11 VITALS
OXYGEN SATURATION: 98 % | SYSTOLIC BLOOD PRESSURE: 138 MMHG | RESPIRATION RATE: 18 BRPM | BODY MASS INDEX: 44.11 KG/M2 | HEART RATE: 56 BPM | HEIGHT: 69 IN | DIASTOLIC BLOOD PRESSURE: 80 MMHG | WEIGHT: 297.8 LBS | TEMPERATURE: 97.6 F

## 2023-07-11 DIAGNOSIS — R63.5 WEIGHT GAIN: ICD-10-CM

## 2023-07-11 DIAGNOSIS — J63.2: ICD-10-CM

## 2023-07-11 DIAGNOSIS — I10 HTN, GOAL BELOW 140/90: ICD-10-CM

## 2023-07-11 DIAGNOSIS — R10.11 RUQ ABDOMINAL PAIN: Primary | ICD-10-CM

## 2023-07-11 DIAGNOSIS — E66.01 MORBID OBESITY DUE TO EXCESS CALORIES (H): ICD-10-CM

## 2023-07-11 DIAGNOSIS — E78.5 HYPERLIPIDEMIA LDL GOAL <130: ICD-10-CM

## 2023-07-11 LAB
ALBUMIN SERPL BCG-MCNC: 4.2 G/DL (ref 3.5–5.2)
ALP SERPL-CCNC: 93 U/L (ref 40–129)
ALT SERPL W P-5'-P-CCNC: 31 U/L (ref 0–70)
ANION GAP SERPL CALCULATED.3IONS-SCNC: 11 MMOL/L (ref 7–15)
AST SERPL W P-5'-P-CCNC: 21 U/L (ref 0–45)
BILIRUB DIRECT SERPL-MCNC: <0.2 MG/DL (ref 0–0.3)
BILIRUB SERPL-MCNC: 0.5 MG/DL
BUN SERPL-MCNC: 14.5 MG/DL (ref 8–23)
CALCIUM SERPL-MCNC: 9.2 MG/DL (ref 8.6–10)
CHLORIDE SERPL-SCNC: 108 MMOL/L (ref 98–107)
CREAT SERPL-MCNC: 1.28 MG/DL (ref 0.67–1.17)
DEPRECATED HCO3 PLAS-SCNC: 24 MMOL/L (ref 22–29)
ERYTHROCYTE [DISTWIDTH] IN BLOOD BY AUTOMATED COUNT: 13.7 % (ref 10–15)
GFR SERPL CREATININE-BSD FRML MDRD: 64 ML/MIN/1.73M2
GLUCOSE SERPL-MCNC: 116 MG/DL (ref 70–99)
HCT VFR BLD AUTO: 39.6 % (ref 40–53)
HGB BLD-MCNC: 13.1 G/DL (ref 13.3–17.7)
MCH RBC QN AUTO: 30.4 PG (ref 26.5–33)
MCHC RBC AUTO-ENTMCNC: 33.1 G/DL (ref 31.5–36.5)
MCV RBC AUTO: 92 FL (ref 78–100)
PLATELET # BLD AUTO: 139 10E3/UL (ref 150–450)
POTASSIUM SERPL-SCNC: 4.4 MMOL/L (ref 3.4–5.3)
PROT SERPL-MCNC: 6.5 G/DL (ref 6.4–8.3)
RBC # BLD AUTO: 4.31 10E6/UL (ref 4.4–5.9)
SODIUM SERPL-SCNC: 143 MMOL/L (ref 136–145)
TSH SERPL DL<=0.005 MIU/L-ACNC: 3 UIU/ML (ref 0.3–4.2)
WBC # BLD AUTO: 6.4 10E3/UL (ref 4–11)

## 2023-07-11 PROCEDURE — 80053 COMPREHEN METABOLIC PANEL: CPT | Performed by: INTERNAL MEDICINE

## 2023-07-11 PROCEDURE — 84443 ASSAY THYROID STIM HORMONE: CPT | Performed by: INTERNAL MEDICINE

## 2023-07-11 PROCEDURE — 82248 BILIRUBIN DIRECT: CPT | Performed by: INTERNAL MEDICINE

## 2023-07-11 PROCEDURE — 99214 OFFICE O/P EST MOD 30 MIN: CPT | Performed by: INTERNAL MEDICINE

## 2023-07-11 PROCEDURE — 36415 COLL VENOUS BLD VENIPUNCTURE: CPT | Performed by: INTERNAL MEDICINE

## 2023-07-11 PROCEDURE — 85027 COMPLETE CBC AUTOMATED: CPT | Performed by: INTERNAL MEDICINE

## 2023-07-11 RX ORDER — LOSARTAN POTASSIUM 50 MG/1
50 TABLET ORAL DAILY
Qty: 90 TABLET | Refills: 3 | Status: SHIPPED | OUTPATIENT
Start: 2023-07-11

## 2023-07-11 RX ORDER — SIMVASTATIN 40 MG
TABLET ORAL
Qty: 90 TABLET | Refills: 3 | Status: SHIPPED | OUTPATIENT
Start: 2023-07-11

## 2023-07-11 ASSESSMENT — PAIN SCALES - GENERAL: PAINLEVEL: NO PAIN (0)
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
## 2023-07-17 ENCOUNTER — HOSPITAL ENCOUNTER (OUTPATIENT)
Dept: ULTRASOUND IMAGING | Facility: CLINIC | Age: 58
Discharge: HOME | End: 2023-07-17
Attending: INTERNAL MEDICINE | Admitting: INTERNAL MEDICINE
Payer: COMMERCIAL

## 2023-07-17 DIAGNOSIS — R10.11 RUQ ABDOMINAL PAIN: ICD-10-CM

## 2023-07-17 PROCEDURE — 76700 US EXAM ABDOM COMPLETE: CPT

## 2023-08-27 ENCOUNTER — HEALTH MAINTENANCE LETTER (OUTPATIENT)
Age: 58
End: 2023-08-27

## 2023-10-16 ENCOUNTER — OFFICE VISIT (OUTPATIENT)
Dept: FAMILY MEDICINE | Facility: CLINIC | Age: 58
End: 2023-10-16
Payer: COMMERCIAL

## 2023-10-16 VITALS
HEIGHT: 69 IN | OXYGEN SATURATION: 97 % | SYSTOLIC BLOOD PRESSURE: 134 MMHG | BODY MASS INDEX: 42.67 KG/M2 | WEIGHT: 288.13 LBS | TEMPERATURE: 97.5 F | HEART RATE: 60 BPM | RESPIRATION RATE: 18 BRPM | DIASTOLIC BLOOD PRESSURE: 80 MMHG
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
## 2023-10-16 DIAGNOSIS — S40.861D TICK BITE OF RIGHT UPPER ARM, SUBSEQUENT ENCOUNTER: Primary | ICD-10-CM

## 2023-10-16 DIAGNOSIS — K80.20 SYMPTOMATIC CHOLELITHIASIS: ICD-10-CM

## 2023-10-16 LAB — B BURGDOR IGG+IGM SER QL: 0.11

## 2023-10-16 PROCEDURE — 99213 OFFICE O/P EST LOW 20 MIN: CPT | Performed by: PHYSICIAN ASSISTANT

## 2023-10-16 PROCEDURE — 86618 LYME DISEASE ANTIBODY: CPT | Performed by: PHYSICIAN ASSISTANT

## 2023-10-16 PROCEDURE — 36415 COLL VENOUS BLD VENIPUNCTURE: CPT | Performed by: PHYSICIAN ASSISTANT

## 2023-10-16 RX ORDER — DOXYCYCLINE 100 MG/1
200 CAPSULE ORAL ONCE
Qty: 2 CAPSULE | Refills: 0 | Status: SHIPPED | OUTPATIENT
Start: 2023-10-16 | End: 2023-10-16

## 2023-10-16 NOTE — PROGRESS NOTES
"    Erik Good is a 58 year old, presenting for the following health issues:  Follow Up (Tick bite)      10/16/2023    10:43 AM   Additional Questions   Roomed by Miroslava MERCER MA       History of Present Illness       Reason for visit:  Gallbladder    He eats 0-1 servings of fruits and vegetables daily.He consumes 1 sweetened beverage(s) daily.He exercises with enough effort to increase his heart rate 20 to 29 minutes per day.  He exercises with enough effort to increase his heart rate 7 days per week.   He is taking medications regularly.     Tick bite received 10 days ago.  The tick was embedded in his right upper arm. There was a big welt around this. Then  developed a rash all over. This was small red spots that was very itchy. There are a few areas that are resolving but on a whole this is doing much better.  Denies any bullseye type rash. No headaches, fatigue, fevers.     Has had off and on symptoms with his gallbladder for many years. He recently had an ultrasound showing gallstones. He reports if he really watches his diet this is manageable but still does flare at times. Would like to consider having his gallbladder removed.       Review of Systems   Constitutional, HEENT, cardiovascular, pulmonary, GI, , musculoskeletal, neuro, skin, endocrine and psych systems are negative, except as otherwise noted.      Objective    /80   Pulse 60   Temp 97.5  F (36.4  C) (Temporal)   Resp 18   Ht 1.753 m (5' 9\")   Wt 130.7 kg (288 lb 2 oz)   SpO2 97%   BMI 42.55 kg/m    Body mass index is 42.55 kg/m .  Physical Exam   GENERAL: healthy, alert and no distress  NECK: no adenopathy, no asymmetry, masses, or scars and thyroid normal to palpation  RESP: lungs clear to auscultation - no rales, rhonchi or wheezes  CV: regular rate and rhythm, normal S1 S2, no S3 or S4, no murmur, click or rub, no peripheral edema and peripheral pulses strong  ABDOMEN: soft, nontender, no hepatosplenomegaly, no masses and " bowel sounds normal  SKIN: Small red defect on right bicep from tick bite. Residual rash on bilateral lower legs and upper arms - resolving  PSYCH: mentation appears normal, affect normal/bright      Assessment & Plan     Tick bite of right upper arm, subsequent encounter  One time dose of Doxycycline given today. Lyme testing pending. Further treatment/follow-up pending these results. Encouraged monitoring of symptoms.   - Lyme Disease Total Abs Bld with Reflex to Confirm CLIA; Future  - doxycycline hyclate (VIBRAMYCIN) 100 MG capsule; Take 2 capsules (200 mg) by mouth once for 1 dose  - Lyme Disease Total Abs Bld with Reflex to Confirm CLIA    Symptomatic cholelithiasis  Referral placed to general surgery to consider cholecystectomy given ongoing symptoms.  - Adult General Surg Referral; Future    The patient indicates understanding of these issues and agrees with the plan.    EBONY Devries [facility]

## 2023-10-18 ENCOUNTER — TELEPHONE (OUTPATIENT)
Dept: SURGERY | Facility: CLINIC | Age: 58
End: 2023-10-18

## 2023-10-18 ENCOUNTER — OFFICE VISIT (OUTPATIENT)
Dept: SURGERY | Facility: CLINIC | Age: 58
End: 2023-10-18
Payer: COMMERCIAL

## 2023-10-18 VITALS
WEIGHT: 291 LBS | SYSTOLIC BLOOD PRESSURE: 138 MMHG | RESPIRATION RATE: 18 BRPM | OXYGEN SATURATION: 97 % | TEMPERATURE: 97.6 F | HEIGHT: 69 IN | DIASTOLIC BLOOD PRESSURE: 76 MMHG | BODY MASS INDEX: 43.1 KG/M2 | HEART RATE: 83 BPM

## 2023-10-18 DIAGNOSIS — K80.20 SYMPTOMATIC CHOLELITHIASIS: ICD-10-CM

## 2023-10-18 PROCEDURE — 99204 OFFICE O/P NEW MOD 45 MIN: CPT | Performed by: SURGERY

## 2023-10-18 RX ORDER — CEFAZOLIN SODIUM IN 0.9 % NACL 3 G/100 ML
3 INTRAVENOUS SOLUTION, PIGGYBACK (ML) INTRAVENOUS SEE ADMIN INSTRUCTIONS
Status: CANCELLED | OUTPATIENT
Start: 2023-10-18

## 2023-10-18 RX ORDER — INDOCYANINE GREEN AND WATER 25 MG
1.25 KIT INJECTION ONCE
Status: CANCELLED | OUTPATIENT
Start: 2023-10-18 | End: 2023-10-18

## 2023-10-18 RX ORDER — CEFAZOLIN SODIUM IN 0.9 % NACL 3 G/100 ML
3 INTRAVENOUS SOLUTION, PIGGYBACK (ML) INTRAVENOUS
Status: CANCELLED | OUTPATIENT
Start: 2023-10-18

## 2023-10-18 ASSESSMENT — PAIN SCALES - GENERAL: PAINLEVEL: NO PAIN (0)

## 2023-10-18 NOTE — PROGRESS NOTES
Patient seen in consultation for symptomatic cholelithiasis by Tad Khanna    HPI:  Patient is a 58 year old male with several year history of recurring and progressively worsening postprandial abdominal pain.  He is noted to have cholelithiasis on CT and ultrasound imaging.  He has had an open appendectomy in the past.  He has certain food aversions and knows that if he eats fatty foods the problem worsens.    Review Of Systems    Skin: negative  Ears/Nose/Throat: negative  Respiratory: No shortness of breath, dyspnea on exertion, cough, or hemoptysis  Cardiovascular: negative  Gastrointestinal: negative  Genitourinary: negative  Musculoskeletal: negative  Neurologic: negative  Hematologic/Lymphatic/Immunologic: negative  Endocrine: negative      Past Medical History:   Diagnosis Date    Abnormal chest CT 5/31/2016    Acute gouty arthritis 9/3/2014    LESLEY (generalized anxiety disorder) 12/11/2017    Low back pain 1/21/2015    MEDICAL HISTORY OF -     htn    Obesity 12/15/2014       Past Surgical History:   Procedure Laterality Date    COLONOSCOPY N/A 1/7/2015    Procedure: COLONOSCOPY;  Surgeon: Anna Holbrook MD;  Location: [facility]    ZZC NONSPECIFIC PROCEDURE      Rt ankle fx-pins    ZZC NONSPECIFIC PROCEDURE      appendectomy       Family History   Problem Relation Age of Onset    Diabetes Father     Diabetes Mother        Social History     Socioeconomic History    Marital status:      Spouse name: Not on file    Number of children: Not on file    Years of education: Not on file    Highest education level: Not on file   Occupational History    Not on file   Tobacco Use    Smoking status: Never    Smokeless tobacco: Never   Vaping Use    Vaping Use: Never used   Substance and Sexual Activity    Alcohol use: No     Alcohol/week: 0.0 standard drinks of alcohol    Drug use: No    Sexual activity: Yes     Partners: Female   Other Topics Concern    Parent/sibling w/ CABG, MI or angioplasty  before 65F 55M? Not Asked   Social History Narrative    Not on file     Social Determinants of Health     Financial Resource Strain: Low Risk  (10/16/2023)    Financial Resource Strain     Within the past 12 months, have you or your family members you live with been unable to get utilities (heat, electricity) when it was really needed?: No   Food Insecurity: Low Risk  (10/16/2023)    Food Insecurity     Within the past 12 months, did you worry that your food would run out before you got money to buy more?: No     Within the past 12 months, did the food you bought just not last and you didn t have money to get more?: No   Transportation Needs: Low Risk  (10/16/2023)    Transportation Needs     Within the past 12 months, has lack of transportation kept you from medical appointments, getting your medicines, non-medical meetings or appointments, work, or from getting things that you need?: No   Physical Activity: Not on file   Stress: Not on file   Social Connections: Not on file   Interpersonal Safety: Not on file   Housing Stability: Low Risk  (10/16/2023)    Housing Stability     Do you have housing? : Yes     Are you worried about losing your housing?: No       Current Outpatient Medications   Medication Sig Dispense Refill    albuterol (PROAIR HFA, PROVENTIL HFA, VENTOLIN HFA) 108 (90 BASE) MCG/ACT inhaler Inhale 2 puffs into the lungs every 4 hours as needed 3 Inhaler 3    FOLIC ACID PO Take 6 mg by mouth daily      losartan (COZAAR) 50 MG tablet Take 1 tablet (50 mg) by mouth daily 90 tablet 3    methotrexate 2.5 MG tablet Take 6 tablets (15 mg) by mouth every 7 days      simvastatin (ZOCOR) 40 MG tablet TAKE ONE TABLET BY MOUTH EVERY NIGHT AT BEDTIME (DUE FOR ANNUAL CHECKUP PRIOR TO ADDITIONAL REFILLS) 90 tablet 3    fluticasone-vilanterol (BREO ELLIPTA) 100-25 MCG/INH inhaler Inhale 1 puff into the lungs         Medications and history reviewed    Physical exam:  Vitals: /76   Pulse 83   Temp 97.6  F  "(36.4  C) (Temporal)   Resp 18   Ht 1.753 m (5' 9\")   Wt 132 kg (291 lb)   SpO2 97%   BMI 42.97 kg/m    BMI= Body mass index is 42.97 kg/m .    Constitutional: alert, non-distressed   Head: normo-cephalic, atraumatic   Cardiovascular: RRR  Respiratory: equal chest rise, good respiratory effort, no audible wheeze  Gastrointestinal: Soft, non-tender, non distended, no masses or hernias palpated   : deferred  Musculoskeletal: moves all extremities   Skin: no suspicious lesions or rashes   Psychiatric: mentation appears normal, affect appropriate   Patient able to get up on table without difficulty.    Labs show:  No results found for this or any previous visit (from the past 24 hour(s)).    Imaging shows:  No results found for this or any previous visit (from the past 744 hour(s)).     Assessment:     ICD-10-CM    1. Symptomatic cholelithiasis  K80.20 Adult General Surg Referral     Case Request: Robot-assisted laparoscopic cholecystectomy, possible open     Case Request: Robot-assisted laparoscopic cholecystectomy, possible open        Plan: I recommend robot-assisted laparoscopic cholecystectomy, possible open for his symptomatic cholelithiasis. Discussed imaging findings and what to expect with surgery. Risks of bleeding, infection, anesthesia complications, conversion to open, injury to nearby structures and bile duct injury all discussed.   We went over my discharge instructions and post-op restrictions.  Patient questions answered and we will schedule the procedure.    45 minutes spent by me on the date of the encounter doing chart review, history and exam, documentation and further activities per the note    Jaime Leonard, DO    "

## 2023-10-18 NOTE — LETTER
10/18/2023         RE: Florentino Gallegos  [address] [facility] [address]        Dear Colleague,    Thank you for referring your patient, Florentino Gallegos, to the [facility]. Please see a copy of my visit note below.    Patient seen in consultation for symptomatic cholelithiasis by Tad Khanna    HPI:  Patient is a 58 year old male with several year history of recurring and progressively worsening postprandial abdominal pain.  He is noted to have cholelithiasis on CT and ultrasound imaging.  He has had an open appendectomy in the past.  He has certain food aversions and knows that if he eats fatty foods the problem worsens.    Review Of Systems    Skin: negative  Ears/Nose/Throat: negative  Respiratory: No shortness of breath, dyspnea on exertion, cough, or hemoptysis  Cardiovascular: negative  Gastrointestinal: negative  Genitourinary: negative  Musculoskeletal: negative  Neurologic: negative  Hematologic/Lymphatic/Immunologic: negative  Endocrine: negative      Past Medical History:   Diagnosis Date     Abnormal chest CT 5/31/2016     Acute gouty arthritis 9/3/2014     LESLEY (generalized anxiety disorder) 12/11/2017     Low back pain 1/21/2015     MEDICAL HISTORY OF -     htn     Obesity 12/15/2014       Past Surgical History:   Procedure Laterality Date     COLONOSCOPY N/A 1/7/2015    Procedure: COLONOSCOPY;  Surgeon: Anna Holbrook MD;  Location: [facility]     ZZC NONSPECIFIC PROCEDURE      Rt ankle fx-pins     ZZC NONSPECIFIC PROCEDURE      appendectomy       Family History   Problem Relation Age of Onset     Diabetes Father      Diabetes Mother        Social History     Socioeconomic History     Marital status:      Spouse name: Not on file     Number of children: Not on file     Years of education: Not on file     Highest education level: Not on file   Occupational History     Not on file   Tobacco Use     Smoking status: Never     Smokeless tobacco: Never    Vaping Use     Vaping Use: Never used   Substance and Sexual Activity     Alcohol use: No     Alcohol/week: 0.0 standard drinks of alcohol     Drug use: No     Sexual activity: Yes     Partners: Female   Other Topics Concern     Parent/sibling w/ CABG, MI or angioplasty before 65F 55M? Not Asked   Social History Narrative     Not on file     Social Determinants of Health     Financial Resource Strain: Low Risk  (10/16/2023)    Financial Resource Strain      Within the past 12 months, have you or your family members you live with been unable to get utilities (heat, electricity) when it was really needed?: No   Food Insecurity: Low Risk  (10/16/2023)    Food Insecurity      Within the past 12 months, did you worry that your food would run out before you got money to buy more?: No      Within the past 12 months, did the food you bought just not last and you didn t have money to get more?: No   Transportation Needs: Low Risk  (10/16/2023)    Transportation Needs      Within the past 12 months, has lack of transportation kept you from medical appointments, getting your medicines, non-medical meetings or appointments, work, or from getting things that you need?: No   Physical Activity: Not on file   Stress: Not on file   Social Connections: Not on file   Interpersonal Safety: Not on file   Housing Stability: Low Risk  (10/16/2023)    Housing Stability      Do you have housing? : Yes      Are you worried about losing your housing?: No       Current Outpatient Medications   Medication Sig Dispense Refill     albuterol (PROAIR HFA, PROVENTIL HFA, VENTOLIN HFA) 108 (90 BASE) MCG/ACT inhaler Inhale 2 puffs into the lungs every 4 hours as needed 3 Inhaler 3     FOLIC ACID PO Take 6 mg by mouth daily       losartan (COZAAR) 50 MG tablet Take 1 tablet (50 mg) by mouth daily 90 tablet 3     methotrexate 2.5 MG tablet Take 6 tablets (15 mg) by mouth every 7 days       simvastatin (ZOCOR) 40 MG tablet TAKE ONE TABLET BY MOUTH  "EVERY NIGHT AT BEDTIME (DUE FOR ANNUAL CHECKUP PRIOR TO ADDITIONAL REFILLS) 90 tablet 3     fluticasone-vilanterol (BREO ELLIPTA) 100-25 MCG/INH inhaler Inhale 1 puff into the lungs         Medications and history reviewed    Physical exam:  Vitals: /76   Pulse 83   Temp 97.6  F (36.4  C) (Temporal)   Resp 18   Ht 1.753 m (5' 9\")   Wt 132 kg (291 lb)   SpO2 97%   BMI 42.97 kg/m    BMI= Body mass index is 42.97 kg/m .    Constitutional: alert, non-distressed   Head: normo-cephalic, atraumatic   Cardiovascular: RRR  Respiratory: equal chest rise, good respiratory effort, no audible wheeze  Gastrointestinal: Soft, non-tender, non distended, no masses or hernias palpated   : deferred  Musculoskeletal: moves all extremities   Skin: no suspicious lesions or rashes   Psychiatric: mentation appears normal, affect appropriate   Patient able to get up on table without difficulty.    Labs show:  No results found for this or any previous visit (from the past 24 hour(s)).    Imaging shows:  No results found for this or any previous visit (from the past 744 hour(s)).     Assessment:     ICD-10-CM    1. Symptomatic cholelithiasis  K80.20 Adult General Surg Referral     Case Request: Robot-assisted laparoscopic cholecystectomy, possible open     Case Request: Robot-assisted laparoscopic cholecystectomy, possible open        Plan: I recommend robot-assisted laparoscopic cholecystectomy, possible open for his symptomatic cholelithiasis. Discussed imaging findings and what to expect with surgery. Risks of bleeding, infection, anesthesia complications, conversion to open, injury to nearby structures and bile duct injury all discussed.   We went over my discharge instructions and post-op restrictions.  Patient questions answered and we will schedule the procedure.    45 minutes spent by me on the date of the encounter doing chart review, history and exam, documentation and further activities per the note    Jaime KRUGER " DO Horacio      Again, thank you for allowing me to participate in the care of your patient.        Sincerely,        Jaime Leonard, DO

## 2023-10-20 ENCOUNTER — TELEPHONE (OUTPATIENT)
Dept: FAMILY MEDICINE | Facility: CLINIC | Age: 58
End: 2023-10-20
Payer: COMMERCIAL

## 2023-10-20 NOTE — TELEPHONE ENCOUNTER
----- Message from Yaz Machado PA-C sent at 10/20/2023  6:25 AM CDT -----  Please notify patient/parent of result as [employer] message not read.    Yaz Machado PA-C

## 2023-10-23 NOTE — TELEPHONE ENCOUNTER
Seen by patient Florentino MONTES Tangmelissamairatylor on 10/20/2023 10:21 PM       Yaz Gonzalez MA

## 2023-11-05 ENCOUNTER — HEALTH MAINTENANCE LETTER (OUTPATIENT)
Age: 58
End: 2023-11-05

## 2023-11-16 ENCOUNTER — OFFICE VISIT (OUTPATIENT)
Dept: FAMILY MEDICINE | Facility: CLINIC | Age: 58
End: 2023-11-16
Payer: COMMERCIAL

## 2023-11-16 VITALS
WEIGHT: 291.5 LBS | BODY MASS INDEX: 43.17 KG/M2 | HEIGHT: 69 IN | RESPIRATION RATE: 18 BRPM | OXYGEN SATURATION: 96 % | TEMPERATURE: 97.5 F | SYSTOLIC BLOOD PRESSURE: 140 MMHG | HEART RATE: 64 BPM | DIASTOLIC BLOOD PRESSURE: 84 MMHG

## 2023-11-16 DIAGNOSIS — J63.2: ICD-10-CM

## 2023-11-16 DIAGNOSIS — E78.5 HYPERLIPIDEMIA LDL GOAL <130: ICD-10-CM

## 2023-11-16 DIAGNOSIS — Z23 NEED FOR PROPHYLACTIC VACCINATION AND INOCULATION AGAINST INFLUENZA: ICD-10-CM

## 2023-11-16 DIAGNOSIS — I10 HTN, GOAL BELOW 140/90: ICD-10-CM

## 2023-11-16 DIAGNOSIS — Z01.818 PREOPERATIVE EXAMINATION: Primary | ICD-10-CM

## 2023-11-16 DIAGNOSIS — K80.20 SYMPTOMATIC CHOLELITHIASIS: ICD-10-CM

## 2023-11-16 DIAGNOSIS — E66.01 MORBID OBESITY DUE TO EXCESS CALORIES (H): ICD-10-CM

## 2023-11-16 PROCEDURE — 99214 OFFICE O/P EST MOD 30 MIN: CPT | Mod: 25 | Performed by: NURSE PRACTITIONER

## 2023-11-16 PROCEDURE — 90686 IIV4 VACC NO PRSV 0.5 ML IM: CPT | Performed by: NURSE PRACTITIONER

## 2023-11-16 PROCEDURE — 90471 IMMUNIZATION ADMIN: CPT | Performed by: NURSE PRACTITIONER

## 2023-11-16 RX ORDER — FLUTICASONE FUROATE AND VILANTEROL TRIFENATATE 200; 25 UG/1; UG/1
POWDER RESPIRATORY (INHALATION)
COMMUNITY
Start: 2023-09-20

## 2023-11-16 NOTE — PATIENT INSTRUCTIONS
Instructed patient to cancel surgery and reschedule if develops high fever or is vomiting 1-3 days before surgery. Pre Operative History and Physical is good for 30 days.    Other Pre-Op Orders for when to stop eating the night before surgery, time of surgery, where & when to check in, parking, and any other specific pre-operative orders come from the surgeon's office. You should hear from them at least one day before surgery if not sooner for these specific instructions.    STOP any types of over the counter blood thinning medications (such as aspirin, Motrin/ibuprofen, Aleve/naproxen, vitamin E, or fish oil) 1 week prior to surgery. Tylenol (or acetaminophen) is okay to take for pain if needed    Recommend no alcohol consumption at least 48 hours prior to surgery    USE: any inhalers the morning of surgery as usual

## 2023-11-16 NOTE — PROGRESS NOTES
[address] [address]  Phone: [phone]  Fax: [phone]  Primary Provider: Tad Khanna  Pre-op Performing Provider: JOSIE JOHNS      PREOPERATIVE EVALUATION:  Today's date: 11/16/2023    Florentino is a 58 year old, presenting for the following:  Pre-Op Exam        11/16/2023     7:50 AM   Additional Questions   Roomed by Miroslava MERCER MA       Surgical Information:  Surgery/Procedure: Robot-assisted laparoscopic cholecystectomy, possible open   Surgery Location: [facility]  Surgeon: joseph  Surgery Date: 12-1-23  Time of Surgery: 12pm  Where patient plans to recover: At home with family  Fax number for surgical facility: Note does not need to be faxed, will be available electronically in Epic.    Assessment & Plan     The proposed surgical procedure is considered INTERMEDIATE risk.    Preoperative examination  Symptomatic cholelithiasis    Chronic beryllium disease (H)  Stable following with [facility] in [place]    HTN, goal below 140/90  BP is slightly higher than desired today, he has not yet taken his BP meds this morning. Continue low sodium diet and efforts of weight loss    HYPERLIPIDEMIA LDL GOAL <130  Taking Simvastatin day    Morbid obesity due to excess calories (H)  Discussed recommendations for weight loss with healthy diet and regular exercise.     Need for prophylactic vaccination and inoculation against influenza      Possible Sleep Apnea: Using CPAP nightly           - No identified additional risk factors other than previously addressed    Antiplatelet or Anticoagulation Medication Instructions:   - Patient is on no antiplatelet or anticoagulation medications.    Additional Medication Instructions:  Patient is to take all scheduled medications on the day of surgery    RECOMMENDATION:  APPROVAL GIVEN to proceed with proposed procedure, without further diagnostic  "evaluation.            Subjective       HPI related to upcoming procedure: Florentino reports having \"gallbladder attacks\" for the past year and a half. He has been modifying his diet to avoid attacks which has been working well for him until late. He had noticed processed, fatty foods were triggering for him. He is not currently having any symptoms, fever, chills, nausea or vomiting.     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 He has some difficulty breathing and shortness of breath with activity related to this. He is taking Methotrexate daily, 2.5mg and Breo Ellipta daily. He does have albuterol inhaler that he uses a few times per week. He also has a history of hypertension and hyperlipidemia, treatment with losartan and simvastatin. He has not taken his medications yet this morning.         11/16/2023     7:51 AM   Preop Questions   1. Have you ever had a heart attack or stroke? No   2. Have you ever had surgery on your heart or blood vessels, such as a stent placement, a coronary artery bypass, or surgery on an artery in your head, neck, heart, or legs? No   3. Do you have chest pain with activity? YES - Related to chronic lung disease and previous lung biopsy.    4. Do you have a history of  heart failure? No   5. Do you currently have a cold, bronchitis or symptoms of other infection? No   6. Do you have a cough, shortness of breath, or wheezing? YES - Chronic shortness of breath related to Beryllium disease.    7. Do you or anyone in your family have previous history of blood clots? No   8. Do you or does anyone in your family have a serious bleeding problem such as prolonged bleeding following surgeries or cuts? No   9. Have you ever had problems with anemia or been told to take iron pills? No   10. Have you had any abnormal blood loss such as black, tarry or bloody stools? No   11. Have you ever had a blood transfusion? No   12. Are you willing to have a " blood transfusion if it is medically needed before, during, or after your surgery? Yes   13. Have you or any of your relatives ever had problems with anesthesia? No   14. Do you have sleep apnea, excessive snoring or daytime drowsiness? YES - Uses CPAP nightly    14a. Do you have a CPAP machine? Yes   15. Do you have any artifical heart valves or other implanted medical devices like a pacemaker, defibrillator, or continuous glucose monitor? No   16. Do you have artificial joints? No   17. Are you allergic to latex? No       Health Care Directive:  Patient does not have a Health Care Directive or Living Will: Discussed advance care planning with patient; however, patient declined at this time.    Preoperative Review of :   reviewed - no record of controlled substances prescribed.          Review of Systems  Constitutional, neuro, ENT, endocrine, pulmonary, cardiac, gastrointestinal, genitourinary, musculoskeletal, integument and psychiatric systems are negative, except as otherwise noted.    Patient Active Problem List    Diagnosis Date Noted    LESLEY (generalized anxiety disorder) 12/11/2017     Priority: Medium    Morbid obesity due to excess calories (H) 05/12/2017     Priority: Medium    Berylliosis (H) 12/15/2016     Priority: Medium    Abnormal chest CT 05/31/2016     Priority: Medium    Low back pain 01/21/2015     Priority: Medium    Acute gouty arthritis 09/03/2014     Priority: Medium    HTN, goal below 140/90 01/04/2014     Priority: Medium    HYPERLIPIDEMIA LDL GOAL <130 10/31/2010     Priority: Medium      Past Medical History:   Diagnosis Date    Abnormal chest CT 5/31/2016    Acute gouty arthritis 9/3/2014    LESLEY (generalized anxiety disorder) 12/11/2017    Low back pain 1/21/2015    MEDICAL HISTORY OF -     htn    Obesity 12/15/2014     Past Surgical History:   Procedure Laterality Date    COLONOSCOPY N/A 1/7/2015    Procedure: COLONOSCOPY;  Surgeon: Anna Holbrook MD;  Location: [facility]     "Z NONSPECIFIC PROCEDURE      Rt ankle fx-pins    [facility] NONSPECIFIC PROCEDURE      appendectomy     Current Outpatient Medications   Medication Sig Dispense Refill    albuterol (PROAIR HFA, PROVENTIL HFA, VENTOLIN HFA) 108 (90 BASE) MCG/ACT inhaler Inhale 2 puffs into the lungs every 4 hours as needed 3 Inhaler 3    fluticasone-vilanterol (BREO ELLIPTA) 100-25 MCG/INH inhaler Inhale 1 puff into the lungs      FOLIC ACID PO Take 6 mg by mouth daily      losartan (COZAAR) 50 MG tablet Take 1 tablet (50 mg) by mouth daily 90 tablet 3    methotrexate 2.5 MG tablet Take 6 tablets (15 mg) by mouth every 7 days      simvastatin (ZOCOR) 40 MG tablet TAKE ONE TABLET BY MOUTH EVERY NIGHT AT BEDTIME (DUE FOR ANNUAL CHECKUP PRIOR TO ADDITIONAL REFILLS) 90 tablet 3       Allergies   Allergen Reactions    No Known Drug Allergy         Social History     Tobacco Use    Smoking status: Never    Smokeless tobacco: Never   Substance Use Topics    Alcohol use: No     Alcohol/week: 0.0 standard drinks of alcohol       History   Drug Use No         Objective     BP (!) 140/84   Pulse 64   Temp 97.5  F (36.4  C) (Temporal)   Resp 18   Ht 1.753 m (5' 9\")   Wt 132.2 kg (291 lb 8 oz)   SpO2 96%   BMI 43.05 kg/m      Physical Exam    GENERAL APPEARANCE: healthy, alert and no distress     EYES: EOMI,  PERRL     HENT: ear canals and TM's normal and nose and mouth without ulcers or lesions     NECK: no adenopathy, no asymmetry, masses, or scars and thyroid normal to palpation     RESP: lungs clear to auscultation - no rales, rhonchi or wheezes     CV: regular rates and rhythm, normal S1 S2, no S3 or S4 and no murmur, click or rub     ABDOMEN:  soft, nontender, no HSM or masses and bowel sounds normal     MS: extremities normal- no gross deformities noted, no evidence of inflammation in joints, FROM in all extremities.     SKIN: no suspicious lesions or rashes     NEURO: Normal strength and tone, sensory exam grossly normal, mentation " intact and speech normal     PSYCH: mentation appears normal. and affect normal/bright     LYMPHATICS: No cervical adenopathy    Recent Labs   Lab Test 07/11/23  1015 07/14/22  1328   HGB 13.1*  --    *  --     143   POTASSIUM 4.4 4.3   CR 1.28* 1.34*   A1C  --  5.6        Diagnostics:  No labs were ordered during this visit. See results in Care Everywhere from 10/24/23.     No EKG required, no history of coronary heart disease, significant arrhythmia, peripheral arterial disease or other structural heart disease. ECHO completed on 10/24/23, results in Care Everywhere.     [facility] - 10/24/2023 3:52 PM MDT   Summary   * Left ventricular systolic function is normal with an ejection fraction of  55-60% by BP MOD and left ventricular diastolic function is normal with a  normal mean LA pressure.   * The right ventricular cavity size is enlarged, 4.6 cm.   * Reduced right ventricular systolic function by visual estimate and  abnormal (grade 2, pseudonormal) right ventricular diastolic function.   * The left atrium is normal in size.   * The right atrium is normal in size.   * There is mild pulmonic regurgitation by Doppler and Color Flow Doppler.   * Right ventricular systolic pressure is normal with an estimated RVSP of 25  mmHg.    Comparison   No prior study for comparison.      Revised Cardiac Risk Index (RCRI):  The patient has the following serious cardiovascular risks for perioperative complications:   - No serious cardiac risks = 0 points     RCRI Interpretation: 1 point: Class II (low risk - 0.9% complication rate)         Signed Electronically by: Lakeshia Renee NP  Copy of this evaluation report is provided to requesting physician.

## 2023-11-16 NOTE — H&P (VIEW-ONLY)
[address] [address]  Phone: [phone]  Fax: [phone]  Primary Provider: Tad Khanna  Pre-op Performing Provider: JOSIE JOHNS      PREOPERATIVE EVALUATION:  Today's date: 11/16/2023    Florentino is a 58 year old, presenting for the following:  Pre-Op Exam        11/16/2023     7:50 AM   Additional Questions   Roomed by Miroslava MERCER MA       Surgical Information:  Surgery/Procedure: Robot-assisted laparoscopic cholecystectomy, possible open   Surgery Location: [facility]  Surgeon: joseph  Surgery Date: 12-1-23  Time of Surgery: 12pm  Where patient plans to recover: At home with family  Fax number for surgical facility: Note does not need to be faxed, will be available electronically in Epic.    Assessment & Plan     The proposed surgical procedure is considered INTERMEDIATE risk.    Preoperative examination  Symptomatic cholelithiasis    Chronic beryllium disease (H)  Stable following with [facility]ist in [place]    HTN, goal below 140/90  BP is slightly higher than desired today, he has not yet taken his BP meds this morning. Continue low sodium diet and efforts of weight loss    HYPERLIPIDEMIA LDL GOAL <130  Taking Simvastatin day    Morbid obesity due to excess calories (H)  Discussed recommendations for weight loss with healthy diet and regular exercise.     Need for prophylactic vaccination and inoculation against influenza      Possible Sleep Apnea: Using CPAP nightly           - No identified additional risk factors other than previously addressed    Antiplatelet or Anticoagulation Medication Instructions:   - Patient is on no antiplatelet or anticoagulation medications.    Additional Medication Instructions:  Patient is to take all scheduled medications on the day of surgery    RECOMMENDATION:  APPROVAL GIVEN to proceed with proposed procedure, without further diagnostic  "evaluation.            Subjective       HPI related to upcoming procedure: Florentino reports having \"gallbladder attacks\" for the past year and a half. He has been modifying his diet to avoid attacks which has been working well for him until late. He had noticed processed, fatty foods were triggering for him. He is not currently having any symptoms, fever, chills, nausea or vomiting.     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 He has some difficulty breathing and shortness of breath with activity related to this. He is taking Methotrexate daily, 2.5mg and Breo Ellipta daily. He does have albuterol inhaler that he uses a few times per week. He also has a history of hypertension and hyperlipidemia, treatment with losartan and simvastatin. He has not taken his medications yet this morning.         11/16/2023     7:51 AM   Preop Questions   1. Have you ever had a heart attack or stroke? No   2. Have you ever had surgery on your heart or blood vessels, such as a stent placement, a coronary artery bypass, or surgery on an artery in your head, neck, heart, or legs? No   3. Do you have chest pain with activity? YES - Related to chronic lung disease and previous lung biopsy.    4. Do you have a history of  heart failure? No   5. Do you currently have a cold, bronchitis or symptoms of other infection? No   6. Do you have a cough, shortness of breath, or wheezing? YES - Chronic shortness of breath related to Beryllium disease.    7. Do you or anyone in your family have previous history of blood clots? No   8. Do you or does anyone in your family have a serious bleeding problem such as prolonged bleeding following surgeries or cuts? No   9. Have you ever had problems with anemia or been told to take iron pills? No   10. Have you had any abnormal blood loss such as black, tarry or bloody stools? No   11. Have you ever had a blood transfusion? No   12. Are you willing to have a " blood transfusion if it is medically needed before, during, or after your surgery? Yes   13. Have you or any of your relatives ever had problems with anesthesia? No   14. Do you have sleep apnea, excessive snoring or daytime drowsiness? YES - Uses CPAP nightly    14a. Do you have a CPAP machine? Yes   15. Do you have any artifical heart valves or other implanted medical devices like a pacemaker, defibrillator, or continuous glucose monitor? No   16. Do you have artificial joints? No   17. Are you allergic to latex? No       Health Care Directive:  Patient does not have a Health Care Directive or Living Will: Discussed advance care planning with patient; however, patient declined at this time.    Preoperative Review of :   reviewed - no record of controlled substances prescribed.          Review of Systems  Constitutional, neuro, ENT, endocrine, pulmonary, cardiac, gastrointestinal, genitourinary, musculoskeletal, integument and psychiatric systems are negative, except as otherwise noted.    Patient Active Problem List    Diagnosis Date Noted    LESLEY (generalized anxiety disorder) 12/11/2017     Priority: Medium    Morbid obesity due to excess calories (H) 05/12/2017     Priority: Medium    Berylliosis (H) 12/15/2016     Priority: Medium    Abnormal chest CT 05/31/2016     Priority: Medium    Low back pain 01/21/2015     Priority: Medium    Acute gouty arthritis 09/03/2014     Priority: Medium    HTN, goal below 140/90 01/04/2014     Priority: Medium    HYPERLIPIDEMIA LDL GOAL <130 10/31/2010     Priority: Medium      Past Medical History:   Diagnosis Date    Abnormal chest CT 5/31/2016    Acute gouty arthritis 9/3/2014    LESLEY (generalized anxiety disorder) 12/11/2017    Low back pain 1/21/2015    MEDICAL HISTORY OF -     htn    Obesity 12/15/2014     Past Surgical History:   Procedure Laterality Date    COLONOSCOPY N/A 1/7/2015    Procedure: COLONOSCOPY;  Surgeon: Anna Holbrook MD;  Location: [facility]     "Z NONSPECIFIC PROCEDURE      Rt ankle fx-pins    [facility] NONSPECIFIC PROCEDURE      appendectomy     Current Outpatient Medications   Medication Sig Dispense Refill    albuterol (PROAIR HFA, PROVENTIL HFA, VENTOLIN HFA) 108 (90 BASE) MCG/ACT inhaler Inhale 2 puffs into the lungs every 4 hours as needed 3 Inhaler 3    fluticasone-vilanterol (BREO ELLIPTA) 100-25 MCG/INH inhaler Inhale 1 puff into the lungs      FOLIC ACID PO Take 6 mg by mouth daily      losartan (COZAAR) 50 MG tablet Take 1 tablet (50 mg) by mouth daily 90 tablet 3    methotrexate 2.5 MG tablet Take 6 tablets (15 mg) by mouth every 7 days      simvastatin (ZOCOR) 40 MG tablet TAKE ONE TABLET BY MOUTH EVERY NIGHT AT BEDTIME (DUE FOR ANNUAL CHECKUP PRIOR TO ADDITIONAL REFILLS) 90 tablet 3       Allergies   Allergen Reactions    No Known Drug Allergy         Social History     Tobacco Use    Smoking status: Never    Smokeless tobacco: Never   Substance Use Topics    Alcohol use: No     Alcohol/week: 0.0 standard drinks of alcohol       History   Drug Use No         Objective     BP (!) 140/84   Pulse 64   Temp 97.5  F (36.4  C) (Temporal)   Resp 18   Ht 1.753 m (5' 9\")   Wt 132.2 kg (291 lb 8 oz)   SpO2 96%   BMI 43.05 kg/m      Physical Exam    GENERAL APPEARANCE: healthy, alert and no distress     EYES: EOMI,  PERRL     HENT: ear canals and TM's normal and nose and mouth without ulcers or lesions     NECK: no adenopathy, no asymmetry, masses, or scars and thyroid normal to palpation     RESP: lungs clear to auscultation - no rales, rhonchi or wheezes     CV: regular rates and rhythm, normal S1 S2, no S3 or S4 and no murmur, click or rub     ABDOMEN:  soft, nontender, no HSM or masses and bowel sounds normal     MS: extremities normal- no gross deformities noted, no evidence of inflammation in joints, FROM in all extremities.     SKIN: no suspicious lesions or rashes     NEURO: Normal strength and tone, sensory exam grossly normal, mentation " intact and speech normal     PSYCH: mentation appears normal. and affect normal/bright     LYMPHATICS: No cervical adenopathy    Recent Labs   Lab Test 07/11/23  1015 07/14/22  1328   HGB 13.1*  --    *  --     143   POTASSIUM 4.4 4.3   CR 1.28* 1.34*   A1C  --  5.6        Diagnostics:  No labs were ordered during this visit. See results in Care Everywhere from 10/24/23.     No EKG required, no history of coronary heart disease, significant arrhythmia, peripheral arterial disease or other structural heart disease. ECHO completed on 10/24/23, results in Care Everywhere.     [facility] - 10/24/2023 3:52 PM MDT   Summary   * Left ventricular systolic function is normal with an ejection fraction of  55-60% by BP MOD and left ventricular diastolic function is normal with a  normal mean LA pressure.   * The right ventricular cavity size is enlarged, 4.6 cm.   * Reduced right ventricular systolic function by visual estimate and  abnormal (grade 2, pseudonormal) right ventricular diastolic function.   * The left atrium is normal in size.   * The right atrium is normal in size.   * There is mild pulmonic regurgitation by Doppler and Color Flow Doppler.   * Right ventricular systolic pressure is normal with an estimated RVSP of 25  mmHg.    Comparison   No prior study for comparison.      Revised Cardiac Risk Index (RCRI):  The patient has the following serious cardiovascular risks for perioperative complications:   - No serious cardiac risks = 0 points     RCRI Interpretation: 1 point: Class II (low risk - 0.9% complication rate)         Signed Electronically by: Lakeshia Renee NP  Copy of this evaluation report is provided to requesting physician.

## 2023-11-16 NOTE — PROGRESS NOTES
Prior to immunization administration, verified patients identity using patient s name and date of birth. Please see Immunization Activity for additional information.     Screening Questionnaire for Adult Immunization    Are you sick today?   No   Do you have allergies to medications, food, a vaccine component or latex?   No   Have you ever had a serious reaction after receiving a vaccination?   No   Do you have a long-term health problem with heart, lung, kidney, or metabolic disease (e.g., diabetes), asthma, a blood disorder, no spleen, complement component deficiency, a cochlear implant, or a spinal fluid leak?  Are you on long-term aspirin therapy?   Yes   Do you have cancer, leukemia, HIV/AIDS, or any other immune system problem?   No   Do you have a parent, brother, or sister with an immune system problem?   No   In the past 3 months, have you taken medications that affect  your immune system, such as prednisone, other steroids, or anticancer drugs; drugs for the treatment of rheumatoid arthritis, Crohn s disease, or psoriasis; or have you had radiation treatments?   No   Have you had a seizure, or a brain or other nervous system problem?   No   During the past year, have you received a transfusion of blood or blood    products, or been given immune (gamma) globulin or antiviral drug?   No   For women: Are you pregnant or is there a chance you could become       pregnant during the next month?   No   Have you received any vaccinations in the past 4 weeks?   No     Immunization questionnaire answers were all negative.      Patient instructed to remain in clinic for 15 minutes afterwards, and to report any adverse reactions.     Screening performed by Miroslava Multani MA on 11/16/2023 at 8:52 AM.

## 2023-12-01 ENCOUNTER — ANESTHESIA (OUTPATIENT)
Dept: SURGERY | Facility: CLINIC | Age: 58
End: 2023-12-01
Payer: COMMERCIAL

## 2023-12-01 ENCOUNTER — ANESTHESIA EVENT (OUTPATIENT)
Dept: SURGERY | Facility: CLINIC | Age: 58
End: 2023-12-01
Payer: COMMERCIAL

## 2023-12-01 ENCOUNTER — HOSPITAL ENCOUNTER (OUTPATIENT)
Facility: CLINIC | Age: 58
Discharge: HOME | End: 2023-12-01
Attending: SURGERY | Admitting: SURGERY
Payer: COMMERCIAL

## 2023-12-01 VITALS
WEIGHT: 291.45 LBS | SYSTOLIC BLOOD PRESSURE: 154 MMHG | OXYGEN SATURATION: 98 % | TEMPERATURE: 98.9 F | HEART RATE: 56 BPM | BODY MASS INDEX: 43.17 KG/M2 | DIASTOLIC BLOOD PRESSURE: 79 MMHG | HEIGHT: 69 IN | RESPIRATION RATE: 13 BRPM

## 2023-12-01 DIAGNOSIS — Z90.49 S/P LAPAROSCOPIC CHOLECYSTECTOMY: Primary | ICD-10-CM

## 2023-12-01 PROCEDURE — 370N000017 HC ANESTHESIA TECHNICAL FEE, PER MIN: Performed by: SURGERY

## 2023-12-01 PROCEDURE — 47562 LAPAROSCOPIC CHOLECYSTECTOMY: CPT | Performed by: SURGERY

## 2023-12-01 PROCEDURE — S2900 ROBOTIC SURGICAL SYSTEM: HCPCS | Performed by: SURGERY

## 2023-12-01 PROCEDURE — 250N000013 HC RX MED GY IP 250 OP 250 PS 637: Performed by: SURGERY

## 2023-12-01 PROCEDURE — 88304 TISSUE EXAM BY PATHOLOGIST: CPT | Mod: TC | Performed by: SURGERY

## 2023-12-01 PROCEDURE — 250N000009 HC RX 250: Performed by: NURSE ANESTHETIST, CERTIFIED REGISTERED

## 2023-12-01 PROCEDURE — 250N000011 HC RX IP 250 OP 636: Performed by: NURSE ANESTHETIST, CERTIFIED REGISTERED

## 2023-12-01 PROCEDURE — 258N000003 HC RX IP 258 OP 636: Performed by: NURSE ANESTHETIST, CERTIFIED REGISTERED

## 2023-12-01 PROCEDURE — 360N000080 HC SURGERY LEVEL 7, PER MIN: Performed by: SURGERY

## 2023-12-01 PROCEDURE — 710N000012 HC RECOVERY PHASE 2, PER MINUTE: Performed by: SURGERY

## 2023-12-01 PROCEDURE — 88304 TISSUE EXAM BY PATHOLOGIST: CPT | Mod: 26 | Performed by: PATHOLOGY

## 2023-12-01 PROCEDURE — 710N000010 HC RECOVERY PHASE 1, LEVEL 2, PER MIN: Performed by: SURGERY

## 2023-12-01 PROCEDURE — 250N000011 HC RX IP 250 OP 636: Performed by: SURGERY

## 2023-12-01 PROCEDURE — 250N000025 HC SEVOFLURANE, PER MIN: Performed by: SURGERY

## 2023-12-01 PROCEDURE — 250N000009 HC RX 250: Performed by: SURGERY

## 2023-12-01 PROCEDURE — 272N000001 HC OR GENERAL SUPPLY STERILE: Performed by: SURGERY

## 2023-12-01 PROCEDURE — 999N000141 HC STATISTIC PRE-PROCEDURE NURSING ASSESSMENT: Performed by: SURGERY

## 2023-12-01 RX ORDER — CEFAZOLIN SODIUM/WATER 3 G/30 ML
3 SYRINGE (ML) INTRAVENOUS
Status: COMPLETED | OUTPATIENT
Start: 2023-12-01 | End: 2023-12-01

## 2023-12-01 RX ORDER — ONDANSETRON 2 MG/ML
4 INJECTION INTRAMUSCULAR; INTRAVENOUS EVERY 30 MIN PRN
Status: CANCELLED | OUTPATIENT
Start: 2023-12-01

## 2023-12-01 RX ORDER — ONDANSETRON 4 MG/1
4 TABLET, ORALLY DISINTEGRATING ORAL EVERY 30 MIN PRN
Status: DISCONTINUED | OUTPATIENT
Start: 2023-12-01 | End: 2023-12-01 | Stop reason: HOSPADM

## 2023-12-01 RX ORDER — SODIUM CHLORIDE, SODIUM LACTATE, POTASSIUM CHLORIDE, CALCIUM CHLORIDE 600; 310; 30; 20 MG/100ML; MG/100ML; MG/100ML; MG/100ML
INJECTION, SOLUTION INTRAVENOUS CONTINUOUS
Status: DISCONTINUED | OUTPATIENT
Start: 2023-12-01 | End: 2023-12-01 | Stop reason: HOSPADM

## 2023-12-01 RX ORDER — ONDANSETRON 2 MG/ML
4 INJECTION INTRAMUSCULAR; INTRAVENOUS EVERY 30 MIN PRN
Status: DISCONTINUED | OUTPATIENT
Start: 2023-12-01 | End: 2023-12-01 | Stop reason: HOSPADM

## 2023-12-01 RX ORDER — LIDOCAINE 40 MG/G
CREAM TOPICAL
Status: DISCONTINUED | OUTPATIENT
Start: 2023-12-01 | End: 2023-12-01 | Stop reason: HOSPADM

## 2023-12-01 RX ORDER — OXYCODONE AND ACETAMINOPHEN 5; 325 MG/1; MG/1
2 TABLET ORAL
Status: COMPLETED | OUTPATIENT
Start: 2023-12-01 | End: 2023-12-01

## 2023-12-01 RX ORDER — FENTANYL CITRATE 50 UG/ML
INJECTION, SOLUTION INTRAMUSCULAR; INTRAVENOUS PRN
Status: DISCONTINUED | OUTPATIENT
Start: 2023-12-01 | End: 2023-12-01

## 2023-12-01 RX ORDER — INDOCYANINE GREEN AND WATER 25 MG
1.25 KIT INJECTION ONCE
Status: COMPLETED | OUTPATIENT
Start: 2023-12-01 | End: 2023-12-01

## 2023-12-01 RX ORDER — FENTANYL CITRATE 50 UG/ML
50 INJECTION, SOLUTION INTRAMUSCULAR; INTRAVENOUS EVERY 5 MIN PRN
Status: DISCONTINUED | OUTPATIENT
Start: 2023-12-01 | End: 2023-12-01 | Stop reason: HOSPADM

## 2023-12-01 RX ORDER — HYDROMORPHONE HYDROCHLORIDE 1 MG/ML
0.2 INJECTION, SOLUTION INTRAMUSCULAR; INTRAVENOUS; SUBCUTANEOUS EVERY 5 MIN PRN
Status: DISCONTINUED | OUTPATIENT
Start: 2023-12-01 | End: 2023-12-01 | Stop reason: HOSPADM

## 2023-12-01 RX ORDER — HYDROMORPHONE HYDROCHLORIDE 1 MG/ML
0.4 INJECTION, SOLUTION INTRAMUSCULAR; INTRAVENOUS; SUBCUTANEOUS EVERY 5 MIN PRN
Status: DISCONTINUED | OUTPATIENT
Start: 2023-12-01 | End: 2023-12-01 | Stop reason: HOSPADM

## 2023-12-01 RX ORDER — ONDANSETRON 4 MG/1
4 TABLET, ORALLY DISINTEGRATING ORAL EVERY 30 MIN PRN
Status: CANCELLED | OUTPATIENT
Start: 2023-12-01

## 2023-12-01 RX ORDER — ONDANSETRON 2 MG/ML
INJECTION INTRAMUSCULAR; INTRAVENOUS PRN
Status: DISCONTINUED | OUTPATIENT
Start: 2023-12-01 | End: 2023-12-01

## 2023-12-01 RX ORDER — DEXAMETHASONE SODIUM PHOSPHATE 4 MG/ML
INJECTION, SOLUTION INTRA-ARTICULAR; INTRALESIONAL; INTRAMUSCULAR; INTRAVENOUS; SOFT TISSUE PRN
Status: DISCONTINUED | OUTPATIENT
Start: 2023-12-01 | End: 2023-12-01

## 2023-12-01 RX ORDER — LIDOCAINE HYDROCHLORIDE 20 MG/ML
INJECTION, SOLUTION INFILTRATION; PERINEURAL PRN
Status: DISCONTINUED | OUTPATIENT
Start: 2023-12-01 | End: 2023-12-01

## 2023-12-01 RX ORDER — OXYCODONE AND ACETAMINOPHEN 5; 325 MG/1; MG/1
1-2 TABLET ORAL EVERY 4 HOURS PRN
Qty: 12 TABLET | Refills: 0 | Status: SHIPPED | OUTPATIENT
Start: 2023-12-01

## 2023-12-01 RX ORDER — KETOROLAC TROMETHAMINE 30 MG/ML
INJECTION, SOLUTION INTRAMUSCULAR; INTRAVENOUS PRN
Status: DISCONTINUED | OUTPATIENT
Start: 2023-12-01 | End: 2023-12-01

## 2023-12-01 RX ORDER — CEFAZOLIN SODIUM/WATER 3 G/30 ML
3 SYRINGE (ML) INTRAVENOUS SEE ADMIN INSTRUCTIONS
Status: DISCONTINUED | OUTPATIENT
Start: 2023-12-01 | End: 2023-12-01 | Stop reason: HOSPADM

## 2023-12-01 RX ORDER — FENTANYL CITRATE 50 UG/ML
25 INJECTION, SOLUTION INTRAMUSCULAR; INTRAVENOUS EVERY 5 MIN PRN
Status: DISCONTINUED | OUTPATIENT
Start: 2023-12-01 | End: 2023-12-01 | Stop reason: HOSPADM

## 2023-12-01 RX ORDER — BUPIVACAINE HYDROCHLORIDE AND EPINEPHRINE 2.5; 5 MG/ML; UG/ML
INJECTION, SOLUTION INFILTRATION; PERINEURAL PRN
Status: DISCONTINUED | OUTPATIENT
Start: 2023-12-01 | End: 2023-12-01 | Stop reason: HOSPADM

## 2023-12-01 RX ORDER — ACETAMINOPHEN 325 MG/1
975 TABLET ORAL EVERY 6 HOURS PRN
Status: CANCELLED | OUTPATIENT
Start: 2023-12-01

## 2023-12-01 RX ORDER — PROPOFOL 10 MG/ML
INJECTION, EMULSION INTRAVENOUS PRN
Status: DISCONTINUED | OUTPATIENT
Start: 2023-12-01 | End: 2023-12-01

## 2023-12-01 RX ADMIN — OXYCODONE HYDROCHLORIDE AND ACETAMINOPHEN 2 TABLET: 5; 325 TABLET ORAL at 13:56

## 2023-12-01 RX ADMIN — SUGAMMADEX 300 MG: 100 INJECTION, SOLUTION INTRAVENOUS at 13:13

## 2023-12-01 RX ADMIN — Medication 3 G: at 11:50

## 2023-12-01 RX ADMIN — LIDOCAINE HYDROCHLORIDE 50 MG: 20 INJECTION, SOLUTION INFILTRATION; PERINEURAL at 11:52

## 2023-12-01 RX ADMIN — SODIUM CHLORIDE, POTASSIUM CHLORIDE, SODIUM LACTATE AND CALCIUM CHLORIDE: 600; 310; 30; 20 INJECTION, SOLUTION INTRAVENOUS at 11:08

## 2023-12-01 RX ADMIN — Medication 1.25 MG: at 11:15

## 2023-12-01 RX ADMIN — PROPOFOL 250 MG: 10 INJECTION, EMULSION INTRAVENOUS at 11:52

## 2023-12-01 RX ADMIN — MIDAZOLAM 2 MG: 1 INJECTION INTRAMUSCULAR; INTRAVENOUS at 11:48

## 2023-12-01 RX ADMIN — FENTANYL CITRATE 50 MCG: 50 INJECTION INTRAMUSCULAR; INTRAVENOUS at 13:18

## 2023-12-01 RX ADMIN — FENTANYL CITRATE 50 MCG: 50 INJECTION INTRAMUSCULAR; INTRAVENOUS at 11:48

## 2023-12-01 RX ADMIN — KETOROLAC TROMETHAMINE 30 MG: 30 INJECTION, SOLUTION INTRAMUSCULAR at 12:56

## 2023-12-01 RX ADMIN — DEXAMETHASONE SODIUM PHOSPHATE 8 MG: 4 INJECTION, SOLUTION INTRA-ARTICULAR; INTRALESIONAL; INTRAMUSCULAR; INTRAVENOUS; SOFT TISSUE at 11:58

## 2023-12-01 RX ADMIN — ROCURONIUM BROMIDE 70 MG: 50 INJECTION, SOLUTION INTRAVENOUS at 11:54

## 2023-12-01 RX ADMIN — FENTANYL CITRATE 50 MCG: 50 INJECTION INTRAMUSCULAR; INTRAVENOUS at 12:24

## 2023-12-01 RX ADMIN — ONDANSETRON 4 MG: 2 INJECTION INTRAMUSCULAR; INTRAVENOUS at 12:56

## 2023-12-01 RX ADMIN — DEXMEDETOMIDINE HYDROCHLORIDE 10 MCG: 100 INJECTION, SOLUTION INTRAVENOUS at 12:11

## 2023-12-01 RX ADMIN — LIDOCAINE HYDROCHLORIDE 0.1 ML: 10 INJECTION, SOLUTION EPIDURAL; INFILTRATION; INTRACAUDAL; PERINEURAL at 11:08

## 2023-12-01 RX ADMIN — HYDROMORPHONE HYDROCHLORIDE 0.5 MG: 1 INJECTION, SOLUTION INTRAMUSCULAR; INTRAVENOUS; SUBCUTANEOUS at 12:56

## 2023-12-01 ASSESSMENT — ACTIVITIES OF DAILY LIVING (ADL)
ADLS_ACUITY_SCORE: 35
ADLS_ACUITY_SCORE: 33
ADLS_ACUITY_SCORE: 35

## 2023-12-01 NOTE — INTERVAL H&P NOTE
"I have reviewed the surgical (or preoperative) H&P that is linked to this encounter, and examined the patient. There are no significant changes    Clinical Conditions Present on Arrival:  Clinically Significant Risk Factors Present on Admission                  # Severe Obesity: Estimated body mass index is 43.05 kg/m  as calculated from the following:    Height as of 11/16/23: 1.753 m (5' 9\").    Weight as of 11/16/23: 132.2 kg (291 lb 8 oz).       "

## 2023-12-01 NOTE — OP NOTE
Procedure Date: 12/1/2023     PROCEDURE:  Robot-assisted laparoscopic cholecystectomy.     PREOPERATIVE DIAGNOSIS:  symptomatic cholelithiasis     POSTOPERATIVE DIAGNOSIS:  symptomatic cholelithiasis     SURGEON:  Jaime Leonard DO     ASSISTANT:  Miriam Horan PA-C; Assistance was utilized for port placement, instrument exchange, and incision closure.     ANESTHESIA:  General endotracheal anesthesia.     COMPLICATIONS:  None immediately apparent.     SPECIMENS:  Gallbladder and contents.     ESTIMATED BLOOD LOSS:  20 mL     FINDINGS:  gallstones     INDICATIONS FOR PROCEDURE:  The patient is a 58-year-old male whom I met in the surgical clinic with symptomatic cholelithiasis. After our discussion, I recommended robot-assisted laparoscopic cholecystectomy, possible open.  After our informed discussion we agreed to proceed with surgery.     DESCRIPTION OF PROCEDURE:  After the informed consent was obtained, the patient was brought to the preoperative holding area to the operating room and placed in the supine position.  Anesthesia was induced.  They were prepped and draped in the normal sterile fashion.  Timeout was performed.  After the correct patient and correct procedure were verified, we began by making a supraumbilical 8 mm incision.  Veress needle was inserted into the peritoneal cavity and the abdomen was insufflated to 15 mmHg.  Robotic trocar was placed. General survey of the abdomen revealed no gross abnormalities.  The patient was placed in the head up rotated left position.  We placed three additional robotic trocars in the left mid, right mid, and right lateral positions, all under direct visualization.  The robot was then docked.  We used two Cadiere graspers in the lateral most ports and a monopolar hook in the other working port.  The gallbladder had some mild omental adhesions, which were taken down with mostly blunt dissection with a small amount of electrocautery.  Gallbladder was then retracted  from the left lateral most port cephalad.  Then, using the two other working ports, we removed the peritoneum from Brittany's pouch.  Using the hook, I was able to circumferentially dissect around the cystic duct.  This dissection was brought posteriorly until I encountered the cystic artery.  This was also circumferentially dissected.  Posterior to this, the cystic plate was visualized.  At this point, two structures were clearly going into the gallbladder, namely the cystic duct and cystic artery; therefore, the critical view of safety had been achieved.  The robotic clip applier was used to clip the cystic duct on the stay side x2 and the specimen side x1.  The cystic artery was clipped on the stay side x2 and the specimen x1.  Using the hook, the cystic duct and cystic artery were transected.  The gallbladder was then removed from the liver bed using electrocautery with the hook without issue.  small amount clotted blood blocking visualization of cystic duct remnant so raytec temporarily placed to absorb this. Visual inspection of the operative field revealed no apparent complications and no ongoing bleeding.  The gallbladder and raytec were then placed in an EndoCatch bag through the supraumbilical port.  The robot was then undocked.  Using the robotic camera through the remaining ports, we removed the gallbladder from the abdominal cavity.  The supraumbilical port site fascial defect was closed using 0 Vicryl suture and a PMI closure device.  The patient's abdomen was then desufflated, while the ports were removed under direct visualization.  The skin was instilled with 0.25% Marcaine with epinephrine for local anesthesia.  Skin was closed with inverted interrupted 4-0 Monocryl sutures and topical adhesive dressing was applied.  At the completion of the case all instruments, needles and sponges were accounted for after a correct count.  The patient was then awoken from anesthesia and brought to recovery room  in stable condition.     Jaime Leonard DO, FACS

## 2023-12-01 NOTE — ANESTHESIA PREPROCEDURE EVALUATION
Anesthesia Pre-Procedure Evaluation    Patient: Florentino Gallegos   MRN: [id number] : 1965        Procedure : Procedure(s):  Robot-assisted laparoscopic cholecystectomy, possible open          Past Medical History:   Diagnosis Date    Abnormal chest CT 2016    Acute gouty arthritis 9/3/2014    LESLEY (generalized anxiety disorder) 2017    Low back pain 2015    MEDICAL HISTORY OF -     htn    Obesity 12/15/2014      Past Surgical History:   Procedure Laterality Date    COLONOSCOPY N/A 2015    Procedure: COLONOSCOPY;  Surgeon: Anna Holbrook MD;  Location: [facility]    ZZC NONSPECIFIC PROCEDURE      Rt ankle fx-pins    ZZC NONSPECIFIC PROCEDURE      appendectomy      Allergies   Allergen Reactions    No Known Drug Allergy       Social History     Tobacco Use    Smoking status: Never    Smokeless tobacco: Never   Substance Use Topics    Alcohol use: No     Alcohol/week: 0.0 standard drinks of alcohol      Wt Readings from Last 1 Encounters:   23 132.2 kg (291 lb 8 oz)        Anesthesia Evaluation   Pt has had prior anesthetic. Type: General and MAC.    No history of anesthetic complications       ROS/MED HX  ENT/Pulmonary:  - neg pulmonary ROS   (+)                    Mild Persistent, asthma  Treatment: Inhaler daily and Inhaler prn,                 Neurologic:  - neg neurologic ROS     Cardiovascular:     (+) Dyslipidemia hypertension- -   -  - -                                 Previous cardiac testing   Echo: Date: 23 Results:  Interpretation Summary     Mild aortic root dilatation.  The ascending aorta is Mildly dilated.  The visual ejection fraction is estimated at 55-60%.  Right ventricular systolic pressure is elevated, consistent with mild  pulmonary hypertension.  The study was technically difficult.    Stress Test:  Date: Results:    ECG Reviewed:  Date: Results:    Cath:  Date: Results:      METS/Exercise Tolerance:     Hematologic:       Musculoskeletal:   (+)   "arthritis,             GI/Hepatic:       Renal/Genitourinary:       Endo:     (+)               Obesity,       Psychiatric/Substance Use:     (+) psychiatric history 1 and anxiety       Infectious Disease:       Malignancy:  - neg malignancy ROS     Other:            Physical Exam    Airway        Mallampati: II   TM distance: > 3 FB   Neck ROM: limited   Mouth opening: > 3 cm    Respiratory Devices and Support         Dental     Comment: Pt states teeth are weak after medication used to treat his lung disease    (+) Minor Abnormalities - some fillings, tiny chips      Cardiovascular   cardiovascular exam normal          Pulmonary                   OUTSIDE LABS:  CBC:   Lab Results   Component Value Date    WBC 6.4 07/11/2023    WBC 8.1 10/23/2018    HGB 13.1 (L) 07/11/2023    HGB 13.8 10/23/2018    HCT 39.6 (L) 07/11/2023    HCT 42.0 10/23/2018     (L) 07/11/2023     10/23/2018     BMP:   Lab Results   Component Value Date     07/11/2023     07/14/2022    POTASSIUM 4.4 07/11/2023    POTASSIUM 4.3 07/14/2022    CHLORIDE 108 (H) 07/11/2023    CHLORIDE 113 (H) 07/14/2022    CO2 24 07/11/2023    CO2 27 07/14/2022    BUN 14.5 07/11/2023    BUN 16 07/14/2022    CR 1.28 (H) 07/11/2023    CR 1.34 (H) 07/14/2022     (H) 07/11/2023    GLC 98 07/14/2022     COAGS:   Lab Results   Component Value Date    PTT 28 05/31/2016    INR 1.00 05/31/2016     POC: No results found for: \"BGM\", \"HCG\", \"HCGS\"  HEPATIC:   Lab Results   Component Value Date    ALBUMIN 4.2 07/11/2023    PROTTOTAL 6.5 07/11/2023    ALT 31 07/11/2023    AST 21 07/11/2023    ALKPHOS 93 07/11/2023    BILITOTAL 0.5 07/11/2023     OTHER:   Lab Results   Component Value Date    A1C 5.6 07/14/2022    RADHA 9.2 07/11/2023    TSH 3.00 07/11/2023    SED 10 10/17/2011       Anesthesia Plan    ASA Status:  2       Anesthesia Type: General.     - Airway: ETT   Induction: Propofol, Intravenous.   Maintenance: Balanced.    " "    Consents    Anesthesia Plan(s) and associated risks, benefits, and realistic alternatives discussed. Questions answered and patient/representative(s) expressed understanding.     - Discussed: Risks, Benefits and Alternatives for BOTH SEDATION and the PROCEDURE were discussed     - Discussed with:  Patient      - Extended Intubation/Ventilatory Support Discussed: No.      - Patient is DNR/DNI Status: No     Use of blood products discussed: Yes.     - Discussed with: Patient.     Postoperative Care    Pain management: IV analgesics.   PONV prophylaxis: Ondansetron (or other 5HT-3), Dexamethasone or Solumedrol, Background Propofol Infusion     Comments:    Other Comments: The risks and benefits of anesthesia, and the alternatives where applicable, have been discussed with the patient, and they wish to proceed.              EVAN Sung CRNA    I have reviewed the pertinent notes and labs in the chart from the past 30 days and (re)examined the patient.  Any updates or changes from those notes are reflected in this note.              # Severe Obesity: Estimated body mass index is 43.05 kg/m  as calculated from the following:    Height as of 11/16/23: 1.753 m (5' 9\").    Weight as of 11/16/23: 132.2 kg (291 lb 8 oz).      "

## 2023-12-01 NOTE — DISCHARGE INSTRUCTIONS
Follow-up Care:    [facility]    Home Care Following Gallbladder Surgery    Dr. Leonard        Care of the Incision:  Surgical glue was used on your incision, keep it dry for 24 hours.  Then you may shower but don t submerge under water for at least 2 weeks.  Gently pat your incision dry with a freshly laundered towel.  Do not touch your incision with bare hands or pick at scabs.  Leave your incision open to air.  Cover it only if draining, clothing rubs or irritates it.    Activity:  Gradually increase your activity.  Walk short distances several times each day and increase the distance as your strength allows.  To promote circulation, do not cross your legs while sitting.  No strenuous lifting or straining for 2-3 weeks.  Do not lift anything over 10-20 pounds until your doctor approves an increase.  Return to work will be determined by the type of work you do and should be discussed with your physician.  Do not drive or operate equipment while taking prescription pain medicines.  You may drive 1 week after surgery if you have stopped taking prescription pain medicines and can react quickly enough to make an emergency stop if necessary.    Diet:  Continue a low fat diet for 1 week then resume usual diet as tolerated.  Drink plenty of water.  Avoid foods that cause constipation.    REMEMBER--most prescription pain pills cause constipation.  Walking, extra fluids, and increased fiber (fresh fruits and vegetables, etc.) are natural remedies for constipation.  You can also take mineral oil, 1-2 Tablespoons per day.  If still constipated you may try a stool softener such as Colace or Miralax.    Call Your Physician if You Have:  Redness, increased swelling or cloudy drainage from your incision.  A temperature of more than 101 degrees F.  Worsening pain in your incision not relieved by your prescription pain pills and/or a short rest.  Jaundice (yellowing of skin or eyes)  Abdominal distention  (stomach getting very large)  Swelling in your legs  Productive cough  Burning with urination  Any questions or concerns about your recovery, please call     Business hours [phone]    After hours [phone](SALOMÓN) [phone] Nurse Advice Line (24 hours a day)    Follow-up Care:  Make an appointment 2-3 week after your surgery, if not already made.  Call [phone]. [facility]Day [facility]   Adult Discharge Orders & Instructions     For 24 hours after surgery    Get plenty of rest.  A responsible adult must stay with you for at least 24 hours after you leave the hospital.   Do not drive or use heavy equipment.  If you have weakness or tingling, don't drive or use heavy equipment until this feeling goes away.  Do not drink alcohol.  Avoid strenuous or risky activities.  Ask for help when climbing stairs.   You may feel lightheaded.  If so, sit for a few minutes before standing.  Have someone help you get up.   You may have a slight fever. Call the doctor if your fever is over 100 F (37.7 C) (taken under the tongue) or lasts longer than 24 hours.  You may have a dry mouth, a sore throat, muscle aches or trouble sleeping.  These should go away after 24 hours.  Do not make important or legal decisions.  We don t expect you to have any problems from the surgery or treatment you had today. Just in case, here s what to do if you have pain, upset stomach (nausea), bleeding or infection:  Pain:  Take medicines your doctor has prescribed or over-the-counter medicine they have suggested. Resting and using ice packs can help, too. For surgery on an arm or leg, raise it on a pillow to ease swelling. Call your doctor if these methods don t work.  Copyright [employer], Licensed under [employer]  Upset stomach (nausea):  Take anti-nausea medicine approved by your doctor. Drink clear liquids like apple juice, ginger ale, broth or 7-Up. Be sure to drink enough fluids. Rest can help, too. Move to  normal foods when you re ready.   Bleeding:  In the first 24 hours, you may see a little blood on your dressing, about the size of a quarter. You don t need to worry about this much blood, but if the blood spot keeps getting bigger:  Put pressure on the wound if you can, AND  Call your doctor.  Copyright [employer] Licensed under [employer]  Fever/Infection: Please call your doctor if you have any of these signs:  Redness  Swelling  Wound feels warm  Pain gets worse  Bad-smelling fluid leaks from wound  Fever or chills  Call your doctor for any of the followin.  It has been over 8 to 10 hours since surgery and you are still not able to urinate (pass water).    2.  Headache for over 24 hours.    3.  Numbness, tingling or weakness in your legs the day after surgery (if you had spinal anesthesia).    Nurse advice line: [phone]

## 2023-12-01 NOTE — ANESTHESIA POSTPROCEDURE EVALUATION
Patient: Florentino Gallegos    Procedure: Procedure(s):  Robot-assisted laparoscopic cholecystectomy       Anesthesia Type:  General    Note:  Disposition: Outpatient   Postop Pain Control: Uneventful            Sign Out: Well controlled pain   PONV: No   Neuro/Psych: Uneventful            Sign Out: Acceptable/Baseline neuro status   Airway/Respiratory: Uneventful            Sign Out: Acceptable/Baseline resp. status   CV/Hemodynamics: Uneventful            Sign Out: Acceptable CV status   Other NRE: NONE   DID A NON-ROUTINE EVENT OCCUR? No    Event details/Postop Comments:  Pt was happy with anesthesia care.  No complications.  I will follow up with the pt if needed.           Last vitals:  Vitals Value Taken Time   /63 12/01/23 1400   Temp 98.1  F (36.7  C) 12/01/23 1400   Pulse 56 12/01/23 1400   Resp 13 12/01/23 1400   SpO2 99 % 12/01/23 1400       Electronically Signed By: EVAN Sung CRNA  December 1, 2023  2:42 PM

## 2023-12-01 NOTE — ANESTHESIA CARE TRANSFER NOTE
Patient: Florentino Gallegos    Procedure: Procedure(s):  Robot-assisted laparoscopic cholecystectomy       Diagnosis: Symptomatic cholelithiasis [K80.20]  Diagnosis Additional Information: No value filed.    Anesthesia Type:   General     Note:    Oropharynx: oropharynx clear of all foreign objects and spontaneously breathing  Level of Consciousness: drowsy  Oxygen Supplementation: room air    Independent Airway: airway patency satisfactory and stable  Dentition: dentition unchanged  Vital Signs Stable: post-procedure vital signs reviewed and stable  Report to RN Given: handoff report given  Patient transferred to: PACU    Handoff Report: Identifed the Patient, Identified the Reponsible Provider, Reviewed the pertinent medical history, Discussed the surgical course, Reviewed Intra-OP anesthesia mangement and issues during anesthesia, Set expectations for post-procedure period and Allowed opportunity for questions and acknowledgement of understanding      Vitals:  Vitals Value Taken Time   /63 12/01/23 1400   Temp 98.1  F (36.7  C) 12/01/23 1400   Pulse 56 12/01/23 1400   Resp 13 12/01/23 1400   SpO2 99 % 12/01/23 1400       Electronically Signed By: EVAN Sung CRNA  December 1, 2023  2:42 PM

## 2023-12-06 LAB
PATH REPORT.COMMENTS IMP SPEC: NORMAL
PATH REPORT.COMMENTS IMP SPEC: NORMAL
PATH REPORT.FINAL DX SPEC: NORMAL
PATH REPORT.GROSS SPEC: NORMAL
PATH REPORT.MICROSCOPIC SPEC OTHER STN: NORMAL
PATH REPORT.RELEVANT HX SPEC: NORMAL
PHOTO IMAGE: NORMAL

## 2023-12-08 ENCOUNTER — OFFICE VISIT (OUTPATIENT)
Dept: SURGERY | Facility: CLINIC | Age: 58
End: 2023-12-08
Payer: COMMERCIAL

## 2023-12-08 VITALS
SYSTOLIC BLOOD PRESSURE: 128 MMHG | WEIGHT: 291 LBS | HEIGHT: 69 IN | DIASTOLIC BLOOD PRESSURE: 58 MMHG | TEMPERATURE: 98 F | BODY MASS INDEX: 43.1 KG/M2

## 2023-12-08 DIAGNOSIS — Z90.49 S/P LAPAROSCOPIC CHOLECYSTECTOMY: Primary | ICD-10-CM

## 2023-12-08 PROCEDURE — 99024 POSTOP FOLLOW-UP VISIT: CPT | Performed by: SURGERY

## 2023-12-08 NOTE — PROGRESS NOTES
General Surgery Follow Up    Pt returns for follow up visit s/p robotic cholecystectomy    HPI:  Doing well, no concerns.  Tolerating diet without issue.  Incisions healing well.      Past Medical History:   Diagnosis Date    Abnormal chest CT 5/31/2016    Acute gouty arthritis 9/3/2014    LESLEY (generalized anxiety disorder) 12/11/2017    Low back pain 1/21/2015    MEDICAL HISTORY OF -     htn    Obesity 12/15/2014       Past Surgical History:   Procedure Laterality Date    COLONOSCOPY N/A 1/7/2015    Procedure: COLONOSCOPY;  Surgeon: Anna Holbrook MD;  Location: [facility]    LAPAROSCOPIC CHOLECYSTECTOMY N/A 12/1/2023    Procedure: Robot-assisted laparoscopic cholecystectomy;  Surgeon: Jaime Leonard DO;  Location: [facility]    ZZC NONSPECIFIC PROCEDURE      Rt ankle fx-pins    ZZC NONSPECIFIC PROCEDURE      appendectomy       Social History     Socioeconomic History    Marital status:      Spouse name: Not on file    Number of children: Not on file    Years of education: Not on file    Highest education level: Not on file   Occupational History    Not on file   Tobacco Use    Smoking status: Never    Smokeless tobacco: Never   Vaping Use    Vaping Use: Never used   Substance and Sexual Activity    Alcohol use: No     Alcohol/week: 0.0 standard drinks of alcohol    Drug use: No    Sexual activity: Yes     Partners: Female   Other Topics Concern    Parent/sibling w/ CABG, MI or angioplasty before 65F 55M? Not Asked   Social History Narrative    Not on file     Social Determinants of Health     Financial Resource Strain: Low Risk  (11/16/2023)    Financial Resource Strain     Within the past 12 months, have you or your family members you live with been unable to get utilities (heat, electricity) when it was really needed?: No   Food Insecurity: Low Risk  (11/16/2023)    Food Insecurity     Within the past 12 months, did you worry that your food would run out before you got money to buy more?: No      Within the past 12 months, did the food you bought just not last and you didn t have money to get more?: No   Transportation Needs: Low Risk  (11/16/2023)    Transportation Needs     Within the past 12 months, has lack of transportation kept you from medical appointments, getting your medicines, non-medical meetings or appointments, work, or from getting things that you need?: No   Physical Activity: Not on file   Stress: Not on file   Social Connections: Not on file   Interpersonal Safety: Low Risk  (11/16/2023)    Interpersonal Safety     Do you feel physically and emotionally safe where you currently live?: Yes     Within the past 12 months, have you been hit, slapped, kicked or otherwise physically hurt by someone?: No     Within the past 12 months, have you been humiliated or emotionally abused in other ways by your partner or ex-partner?: No   Housing Stability: Low Risk  (11/16/2023)    Housing Stability     Do you have housing? : Yes     Are you worried about losing your housing?: No       Current Outpatient Medications   Medication Sig Dispense Refill    albuterol (PROAIR HFA, PROVENTIL HFA, VENTOLIN HFA) 108 (90 BASE) MCG/ACT inhaler Inhale 2 puffs into the lungs every 4 hours as needed 3 Inhaler 3    BREO ELLIPTA 200-25 MCG/ACT inhaler INHALE 1 PUFF ONCE DAILY AT THE SAME TIME EACH DAY      fluticasone-vilanterol (BREO ELLIPTA) 100-25 MCG/INH inhaler Inhale 1 puff into the lungs      FOLIC ACID PO Take 6 mg by mouth daily      losartan (COZAAR) 50 MG tablet Take 1 tablet (50 mg) by mouth daily 90 tablet 3    methotrexate 2.5 MG tablet Take 6 tablets (15 mg) by mouth every 7 days      simvastatin (ZOCOR) 40 MG tablet TAKE ONE TABLET BY MOUTH EVERY NIGHT AT BEDTIME (DUE FOR ANNUAL CHECKUP PRIOR TO ADDITIONAL REFILLS) 90 tablet 3    oxyCODONE-acetaminophen (PERCOCET) 5-325 MG tablet Take 1-2 tablets by mouth every 4 hours as needed for pain (Patient not taking: Reported on 12/8/2023) 12 tablet 0  "      Medications and history reviewed    Physical exam:  Vitals: /58   Temp 98  F (36.7  C) (Temporal)   Ht 1.753 m (5' 9\")   Wt 132 kg (291 lb)   BMI 42.97 kg/m    BMI= Body mass index is 42.97 kg/m .    HEART: RRR, no new murmurs  LUNGS: CTAB, equal chest rise, good effort  ABD: soft, non tender, non distended  INCISIONS: Clean dry intact, mild ecchymosis  EXT: GRADY, no deformities    PATHOLOGY:  Chronic calculus cholecystitis    Assessment:     ICD-10-CM    1. S/P laparoscopic cholecystectomy  Z90.49         Plan: Doing well.  Pathology reviewed and questions answered.  Follow-up as needed.    10 minutes spent by me on the date of the encounter doing chart review, history and exam, documentation and further activities per the note    Jaime Leonard,     "

## 2023-12-08 NOTE — LETTER
12/8/2023         RE: Florentino Gallegos  [address] [facility] [address]        Dear Colleague,    Thank you for referring your patient, Florentino Gallegos, to the [facility]. Please see a copy of my visit note below.    General Surgery Follow Up    Pt returns for follow up visit s/p robotic cholecystectomy    HPI:  Doing well, no concerns.  Tolerating diet without issue.  Incisions healing well.      Past Medical History:   Diagnosis Date     Abnormal chest CT 5/31/2016     Acute gouty arthritis 9/3/2014     LESLEY (generalized anxiety disorder) 12/11/2017     Low back pain 1/21/2015     MEDICAL HISTORY OF -     htn     Obesity 12/15/2014       Past Surgical History:   Procedure Laterality Date     COLONOSCOPY N/A 1/7/2015    Procedure: COLONOSCOPY;  Surgeon: Anna Holbrook MD;  Location: [facility]     LAPAROSCOPIC CHOLECYSTECTOMY N/A 12/1/2023    Procedure: Robot-assisted laparoscopic cholecystectomy;  Surgeon: Jaime Leonard DO;  Location: [facility]     ZZC NONSPECIFIC PROCEDURE      Rt ankle fx-pins     ZZC NONSPECIFIC PROCEDURE      appendectomy       Social History     Socioeconomic History     Marital status:      Spouse name: Not on file     Number of children: Not on file     Years of education: Not on file     Highest education level: Not on file   Occupational History     Not on file   Tobacco Use     Smoking status: Never     Smokeless tobacco: Never   Vaping Use     Vaping Use: Never used   Substance and Sexual Activity     Alcohol use: No     Alcohol/week: 0.0 standard drinks of alcohol     Drug use: No     Sexual activity: Yes     Partners: Female   Other Topics Concern     Parent/sibling w/ CABG, MI or angioplasty before 65F 55M? Not Asked   Social History Narrative     Not on file     Social Determinants of Health     Financial Resource Strain: Low Risk  (11/16/2023)    Financial Resource Strain      Within the past 12 months, have you or your family members  you live with been unable to get utilities (heat, electricity) when it was really needed?: No   Food Insecurity: Low Risk  (11/16/2023)    Food Insecurity      Within the past 12 months, did you worry that your food would run out before you got money to buy more?: No      Within the past 12 months, did the food you bought just not last and you didn t have money to get more?: No   Transportation Needs: Low Risk  (11/16/2023)    Transportation Needs      Within the past 12 months, has lack of transportation kept you from medical appointments, getting your medicines, non-medical meetings or appointments, work, or from getting things that you need?: No   Physical Activity: Not on file   Stress: Not on file   Social Connections: Not on file   Interpersonal Safety: Low Risk  (11/16/2023)    Interpersonal Safety      Do you feel physically and emotionally safe where you currently live?: Yes      Within the past 12 months, have you been hit, slapped, kicked or otherwise physically hurt by someone?: No      Within the past 12 months, have you been humiliated or emotionally abused in other ways by your partner or ex-partner?: No   Housing Stability: Low Risk  (11/16/2023)    Housing Stability      Do you have housing? : Yes      Are you worried about losing your housing?: No       Current Outpatient Medications   Medication Sig Dispense Refill     albuterol (PROAIR HFA, PROVENTIL HFA, VENTOLIN HFA) 108 (90 BASE) MCG/ACT inhaler Inhale 2 puffs into the lungs every 4 hours as needed 3 Inhaler 3     BREO ELLIPTA 200-25 MCG/ACT inhaler INHALE 1 PUFF ONCE DAILY AT THE SAME TIME EACH DAY       fluticasone-vilanterol (BREO ELLIPTA) 100-25 MCG/INH inhaler Inhale 1 puff into the lungs       FOLIC ACID PO Take 6 mg by mouth daily       losartan (COZAAR) 50 MG tablet Take 1 tablet (50 mg) by mouth daily 90 tablet 3     methotrexate 2.5 MG tablet Take 6 tablets (15 mg) by mouth every 7 days       simvastatin (ZOCOR) 40 MG tablet TAKE ONE  "TABLET BY MOUTH EVERY NIGHT AT BEDTIME (DUE FOR ANNUAL CHECKUP PRIOR TO ADDITIONAL REFILLS) 90 tablet 3     oxyCODONE-acetaminophen (PERCOCET) 5-325 MG tablet Take 1-2 tablets by mouth every 4 hours as needed for pain (Patient not taking: Reported on 12/8/2023) 12 tablet 0       Medications and history reviewed    Physical exam:  Vitals: /58   Temp 98  F (36.7  C) (Temporal)   Ht 1.753 m (5' 9\")   Wt 132 kg (291 lb)   BMI 42.97 kg/m    BMI= Body mass index is 42.97 kg/m .    HEART: RRR, no new murmurs  LUNGS: CTAB, equal chest rise, good effort  ABD: soft, non tender, non distended  INCISIONS: Clean dry intact, mild ecchymosis  EXT: GRADY, no deformities    PATHOLOGY:  Chronic calculus cholecystitis    Assessment:     ICD-10-CM    1. S/P laparoscopic cholecystectomy  Z90.49         Plan: Doing well.  Pathology reviewed and questions answered.  Follow-up as needed.    10 minutes spent by me on the date of the encounter doing chart review, history and exam, documentation and further activities per the note    Jaime Leonard, DO      Again, thank you for allowing me to participate in the care of your patient.        Sincerely,        Jaime Leonard, DO  "

## 2024-01-14 ENCOUNTER — HEALTH MAINTENANCE LETTER (OUTPATIENT)
Age: 59
End: 2024-01-14

## 2024-02-27 DIAGNOSIS — J63.2: Primary | ICD-10-CM

## 2024-02-27 DIAGNOSIS — Z51.81 ENCOUNTER FOR THERAPEUTIC DRUG MONITORING: ICD-10-CM

## 2024-11-19 ENCOUNTER — OFFICE VISIT (OUTPATIENT)
Dept: INTERNAL MEDICINE | Facility: CLINIC | Age: 59
End: 2024-11-19
Payer: COMMERCIAL

## 2024-11-19 VITALS
DIASTOLIC BLOOD PRESSURE: 80 MMHG | HEART RATE: 60 BPM | BODY MASS INDEX: 42.06 KG/M2 | SYSTOLIC BLOOD PRESSURE: 132 MMHG | HEIGHT: 69 IN | TEMPERATURE: 97.2 F | OXYGEN SATURATION: 99 % | WEIGHT: 284 LBS | RESPIRATION RATE: 18 BRPM

## 2024-11-19 DIAGNOSIS — E78.5 HYPERLIPIDEMIA LDL GOAL <130: ICD-10-CM

## 2024-11-19 DIAGNOSIS — J63.2: ICD-10-CM

## 2024-11-19 DIAGNOSIS — Z12.5 SCREENING FOR PROSTATE CANCER: ICD-10-CM

## 2024-11-19 DIAGNOSIS — I10 HTN, GOAL BELOW 140/90: Primary | ICD-10-CM

## 2024-11-19 LAB
CHOLEST SERPL-MCNC: 163 MG/DL
FASTING STATUS PATIENT QL REPORTED: YES
HDLC SERPL-MCNC: 51 MG/DL
LDLC SERPL CALC-MCNC: 80 MG/DL
NONHDLC SERPL-MCNC: 112 MG/DL
PSA SERPL DL<=0.01 NG/ML-MCNC: 1.55 NG/ML (ref 0–4.5)
TRIGL SERPL-MCNC: 158 MG/DL

## 2024-11-19 PROCEDURE — 36415 COLL VENOUS BLD VENIPUNCTURE: CPT | Performed by: INTERNAL MEDICINE

## 2024-11-19 PROCEDURE — 99214 OFFICE O/P EST MOD 30 MIN: CPT | Performed by: INTERNAL MEDICINE

## 2024-11-19 PROCEDURE — G0008 ADMIN INFLUENZA VIRUS VAC: HCPCS | Performed by: INTERNAL MEDICINE

## 2024-11-19 PROCEDURE — 90673 RIV3 VACCINE NO PRESERV IM: CPT | Performed by: INTERNAL MEDICINE

## 2024-11-19 PROCEDURE — G0103 PSA SCREENING: HCPCS | Performed by: INTERNAL MEDICINE

## 2024-11-19 PROCEDURE — 80061 LIPID PANEL: CPT | Performed by: INTERNAL MEDICINE

## 2024-11-19 PROCEDURE — G2211 COMPLEX E/M VISIT ADD ON: HCPCS | Performed by: INTERNAL MEDICINE

## 2024-11-19 RX ORDER — LOSARTAN POTASSIUM 50 MG/1
50 TABLET ORAL DAILY
Qty: 90 TABLET | Refills: 3 | Status: SHIPPED | OUTPATIENT
Start: 2024-11-19

## 2024-11-19 RX ORDER — SIMVASTATIN 40 MG
TABLET ORAL
Qty: 90 TABLET | Refills: 3 | Status: SHIPPED | OUTPATIENT
Start: 2024-11-19

## 2024-11-19 ASSESSMENT — PAIN SCALES - GENERAL: PAINLEVEL_OUTOF10: NO PAIN (0)

## 2024-11-19 NOTE — PROGRESS NOTES
Erik Good is a 59 year old, presenting for the following health issues:  Hypertension and Lipids      11/19/2024     8:05 AM   Additional Questions   Roomed by Kelsea VELA     History of Present Illness       Hypertension: He presents for follow up of hypertension.  He does not check blood pressure  regularly outside of the clinic. Outside blood pressures have been over 140/90. He does not follow a low salt diet. He consumes 0 sweetened beverage(s) daily.He exercises with enough effort to increase his heart rate 10 to 19 minutes per day.  He exercises with enough effort to increase his heart rate 7 days per week.   He is taking medications regularly.                EMR reviewed including:             Complaint, History of Chief Complaint, Corresponding Review of Systems, and Complaint Specific Physical Examination.    #1   Follow-up on hypertension  Blood pressure 132/80.  Continues losartan 50 mg daily.  Denies any side effects from medication.          Exam:   LUNGS: clear with somewhat decreased breath sounds bilaterally, airflow is brisk, no intercostal retraction or stridor is noted. No coughing is noted during visit.   HEART:  regular without rubs, clicks, gallops, or murmurs. PMI is nondisplaced. Upstrokes are brisk. S1,S2 are heard.   GI: Abdomen is soft, without rebound, guarding or tenderness. Bowel sounds are appropriate. No renal bruits are heard.   NEURO: Pt is alert and appropriate. No neurologic lateralization is noted. Cranial nerves 2-12 are intact. Peripheral sensory and motor function are grossly normal.       #2   Follow-up in hyperlipidemia  Watching diet closely.  Continue statin therapy.  BMI is 42.25.  Denies muscle pain or side effects from the medication.          Exam:  As above      #3   History of beryllium exposure with multiple pulmonary granulomas.  Under the care of pulmonary medicine and occupational medicine.  Currently taking methotrexate.  Is on folic acid as  "well.  Denies significant dyspnea with normal activity.          Exam:  As above        Patient has been interviewed, applicable history and applied review of systems have been performed.    Vital Signs:   /80 (BP Location: Right arm, Patient Position: Chair)   Pulse 60   Temp 97.2  F (36.2  C) (Temporal)   Resp 18   Ht 1.746 m (5' 8.75\")   Wt 128.8 kg (284 lb)   SpO2 99%   BMI 42.25 kg/m        Decision Making    Problem and Complexity     1. HTN, goal below 140/90 (Primary)  Continue losartan.  Reviewed recent laboratory work performed by his pulmonary team.  - losartan (COZAAR) 50 MG tablet; Take 1 tablet (50 mg) by mouth daily.  Dispense: 90 tablet; Refill: 3    2. Hyperlipidemia LDL goal <130  Request lipids to be done at next blood draw.  Continue simvastatin.  Recent lab work showed a trivial increase in his transaminase levels.  This will be followed serially.  If transaminase levels continue to elevate, would recommend discontinuation of the statin  - Lipid panel reflex to direct LDL Non-fasting; Future  - simvastatin (ZOCOR) 40 MG tablet; TAKE ONE TABLET BY MOUTH EVERY NIGHT AT BEDTIME  Dispense: 90 tablet; Refill: 3  - Lipid panel reflex to direct LDL Non-fasting    3. Chronic beryllium disease (H)  Follow-up with pulmonary/occupational medicine    4. Screening for prostate cancer  Screening lab to be performed at next blood draw  - PSA, screen; Future  - PSA, screen                                FOLLOW UP   I have asked the patient to make an appointment for followup with me in 6 months or as needed    Regarding routine vaccinations:  I have reviewed the patient's vaccination schedule and discussed the benefits of prophylactic vaccination in detail.  I recommend the patient contact their pharmacist for vaccinations.  Discussed that most insurance companies now favor reimbursement to the pharmacies and it will financially behoove the patient to have vaccinations performed at their " pharmacy.        I have carefully explained the diagnosis and treatment options to the patient.  The patient has displayed an understanding of the above, and all subsequent questions were answered.      DO SRINATH Delatorre    Portions of this note were produced using [employer]  Although every attempt at real-time proof reading has been made, occasional grammar/syntax errors may have been missed.

## 2024-12-22 ENCOUNTER — HEALTH MAINTENANCE LETTER (OUTPATIENT)
Age: 59
End: 2024-12-22

## (undated) DEVICE — DAVINCI XI SEAL UNIVERSAL 5-8MM 470361

## (undated) DEVICE — DAVINCI XI OBTURATOR BLADELESS 8MM 470359

## (undated) DEVICE — SU MONOCRYL 4-0 PS-2 18" UND Y496G

## (undated) DEVICE — PACK GENERAL LAPAOSCOPY

## (undated) DEVICE — GLOVE UNDER INDICATOR PI SZ 6.5 LF 41665

## (undated) DEVICE — DEVICE SUTURE GRASPER TROCAR CLOSURE 14GA PMITCSG

## (undated) DEVICE — SYR 50ML SLIP TIP W/O NDL 309654

## (undated) DEVICE — DEVICE ACTIVE LAP PLUME FILTERATION PUREVIEW 620030100

## (undated) DEVICE — NDL INSUFFLATION 120MM VERRES 172015

## (undated) DEVICE — DEVICE SUTURE PASSER 14GA WECK EFX EFXSP2

## (undated) DEVICE — SUCTION MANIFOLD NEPTUNE 2 SYS 4 PORT 0702-020-000

## (undated) DEVICE — ENDO POUCH UNIVERSAL RETRIEVAL SYSTEM INZII 5MM CD003

## (undated) DEVICE — DAVINCI XI DRAPE COLUMN 470341

## (undated) DEVICE — GLOVE BIOGEL PI ULTRATOUCH G SZ 7.5 42175

## (undated) DEVICE — CLIP ENDO HEMO-LOC GREEN MED/LG 544230

## (undated) DEVICE — GLOVE BIOGEL PI INDICATOR 8.0 LF 41680

## (undated) DEVICE — SU DERMABOND ADVANCED .7ML DNX12

## (undated) DEVICE — DAVINCI XI DRAPE ARM 470015

## (undated) DEVICE — GLOVE BIOGEL PI ULTRATOUCH G SZ 6.5 42165

## (undated) RX ORDER — LIDOCAINE HYDROCHLORIDE 10 MG/ML
INJECTION, SOLUTION EPIDURAL; INFILTRATION; INTRACAUDAL; PERINEURAL
Status: DISPENSED
Start: 2023-12-01

## (undated) RX ORDER — CEFAZOLIN SODIUM/WATER 3 G/30 ML
SYRINGE (ML) INTRAVENOUS
Status: DISPENSED
Start: 2023-12-01

## (undated) RX ORDER — PROPOFOL 10 MG/ML
INJECTION, EMULSION INTRAVENOUS
Status: DISPENSED
Start: 2023-12-01

## (undated) RX ORDER — BUPIVACAINE HYDROCHLORIDE AND EPINEPHRINE 2.5; 5 MG/ML; UG/ML
INJECTION, SOLUTION EPIDURAL; INFILTRATION; INTRACAUDAL; PERINEURAL
Status: DISPENSED
Start: 2023-12-01

## (undated) RX ORDER — HYDROMORPHONE HYDROCHLORIDE 1 MG/ML
INJECTION, SOLUTION INTRAMUSCULAR; INTRAVENOUS; SUBCUTANEOUS
Status: DISPENSED
Start: 2023-12-01

## (undated) RX ORDER — DEXAMETHASONE SODIUM PHOSPHATE 10 MG/ML
INJECTION, SOLUTION INTRAMUSCULAR; INTRAVENOUS
Status: DISPENSED
Start: 2023-12-01

## (undated) RX ORDER — FENTANYL CITRATE 50 UG/ML
INJECTION, SOLUTION INTRAMUSCULAR; INTRAVENOUS
Status: DISPENSED
Start: 2023-12-01

## (undated) RX ORDER — ONDANSETRON 2 MG/ML
INJECTION INTRAMUSCULAR; INTRAVENOUS
Status: DISPENSED
Start: 2023-12-01